# Patient Record
Sex: MALE | Race: WHITE | URBAN - METROPOLITAN AREA
[De-identification: names, ages, dates, MRNs, and addresses within clinical notes are randomized per-mention and may not be internally consistent; named-entity substitution may affect disease eponyms.]

---

## 2023-06-28 ENCOUNTER — TELEPHONE (OUTPATIENT)
Dept: CARDIOLOGY CLINIC | Age: 53
End: 2023-06-28

## 2023-09-13 PROBLEM — I25.10 CORONARY ARTERY DISEASE INVOLVING NATIVE CORONARY ARTERY OF NATIVE HEART WITHOUT ANGINA PECTORIS: Status: ACTIVE | Noted: 2023-09-13

## 2023-09-13 PROBLEM — F19.10 POLYSUBSTANCE ABUSE (HCC): Status: ACTIVE | Noted: 2023-09-13

## 2023-09-13 PROBLEM — I50.42 CHRONIC COMBINED SYSTOLIC AND DIASTOLIC HF (HEART FAILURE) (HCC): Status: ACTIVE | Noted: 2023-09-13

## 2023-09-13 PROBLEM — I48.19 PERSISTENT ATRIAL FIBRILLATION (HCC): Status: ACTIVE | Noted: 2023-09-13

## 2024-01-18 ENCOUNTER — HOSPITAL ENCOUNTER (INPATIENT)
Age: 54
LOS: 5 days | Discharge: NURSING, REHAB OR LONG-TERM CARE | DRG: 201 | End: 2024-01-23
Attending: STUDENT IN AN ORGANIZED HEALTH CARE EDUCATION/TRAINING PROGRAM | Admitting: STUDENT IN AN ORGANIZED HEALTH CARE EDUCATION/TRAINING PROGRAM
Payer: MEDICAID

## 2024-01-18 ENCOUNTER — APPOINTMENT (OUTPATIENT)
Dept: CT IMAGING | Age: 54
DRG: 201 | End: 2024-01-18
Payer: MEDICAID

## 2024-01-18 ENCOUNTER — APPOINTMENT (OUTPATIENT)
Dept: GENERAL RADIOLOGY | Age: 54
DRG: 201 | End: 2024-01-18
Payer: MEDICAID

## 2024-01-18 DIAGNOSIS — R74.01 TRANSAMINITIS: ICD-10-CM

## 2024-01-18 DIAGNOSIS — I50.22 CHRONIC SYSTOLIC HEART FAILURE (HCC): ICD-10-CM

## 2024-01-18 DIAGNOSIS — I48.91 ATRIAL FIBRILLATION WITH RVR (HCC): Primary | ICD-10-CM

## 2024-01-18 DIAGNOSIS — E83.42 HYPOMAGNESEMIA: ICD-10-CM

## 2024-01-18 DIAGNOSIS — Z91.148 NONCOMPLIANCE WITH MEDICATIONS: ICD-10-CM

## 2024-01-18 PROBLEM — R56.9 SEIZURES (HCC): Status: ACTIVE | Noted: 2024-01-18

## 2024-01-18 PROBLEM — I50.32 CHRONIC DIASTOLIC HEART FAILURE (HCC): Status: ACTIVE | Noted: 2023-09-13

## 2024-01-18 PROBLEM — I50.20 HFREF (HEART FAILURE WITH REDUCED EJECTION FRACTION) (HCC): Status: ACTIVE | Noted: 2024-01-18

## 2024-01-18 LAB
ALBUMIN SERPL-MCNC: 3.8 G/DL (ref 3.4–5)
ALBUMIN/GLOB SERPL: 1.2 {RATIO} (ref 1.1–2.2)
ALP SERPL-CCNC: 152 U/L (ref 40–129)
ALT SERPL-CCNC: 125 U/L (ref 10–40)
ANION GAP SERPL CALCULATED.3IONS-SCNC: 15 MMOL/L (ref 3–16)
AST SERPL-CCNC: 119 U/L (ref 15–37)
BASOPHILS # BLD: 0 K/UL (ref 0–0.2)
BASOPHILS NFR BLD: 0.6 %
BILIRUB SERPL-MCNC: 1.8 MG/DL (ref 0–1)
BUN SERPL-MCNC: 11 MG/DL (ref 7–20)
CALCIUM SERPL-MCNC: 8.8 MG/DL (ref 8.3–10.6)
CHLORIDE SERPL-SCNC: 104 MMOL/L (ref 99–110)
CO2 SERPL-SCNC: 22 MMOL/L (ref 21–32)
CREAT SERPL-MCNC: 0.7 MG/DL (ref 0.9–1.3)
DEPRECATED RDW RBC AUTO: 14.1 % (ref 12.4–15.4)
EKG ATRIAL RATE: 136 BPM
EKG DIAGNOSIS: NORMAL
EKG Q-T INTERVAL: 344 MS
EKG QRS DURATION: 90 MS
EKG QTC CALCULATION (BAZETT): 534 MS
EKG R AXIS: 13 DEGREES
EKG T AXIS: 60 DEGREES
EKG VENTRICULAR RATE: 145 BPM
EOSINOPHIL # BLD: 0.1 K/UL (ref 0–0.6)
EOSINOPHIL NFR BLD: 1 %
GFR SERPLBLD CREATININE-BSD FMLA CKD-EPI: >60 ML/MIN/{1.73_M2}
GLUCOSE SERPL-MCNC: 90 MG/DL (ref 70–99)
HCT VFR BLD AUTO: 39.6 % (ref 40.5–52.5)
HGB BLD-MCNC: 13.5 G/DL (ref 13.5–17.5)
INR PPP: 1.3 (ref 0.84–1.16)
LYMPHOCYTES # BLD: 1.2 K/UL (ref 1–5.1)
LYMPHOCYTES NFR BLD: 19.9 %
MAGNESIUM SERPL-MCNC: 1.5 MG/DL (ref 1.8–2.4)
MCH RBC QN AUTO: 30.8 PG (ref 26–34)
MCHC RBC AUTO-ENTMCNC: 34 G/DL (ref 31–36)
MCV RBC AUTO: 90.6 FL (ref 80–100)
MONOCYTES # BLD: 0.5 K/UL (ref 0–1.3)
MONOCYTES NFR BLD: 7.8 %
NEUTROPHILS # BLD: 4.3 K/UL (ref 1.7–7.7)
NEUTROPHILS NFR BLD: 70.7 %
NT-PROBNP SERPL-MCNC: 1734 PG/ML (ref 0–124)
PLATELET # BLD AUTO: 144 K/UL (ref 135–450)
PMV BLD AUTO: 8.6 FL (ref 5–10.5)
POTASSIUM SERPL-SCNC: 3.4 MMOL/L (ref 3.5–5.1)
PROT SERPL-MCNC: 6.9 G/DL (ref 6.4–8.2)
PROTHROMBIN TIME: 16.2 SEC (ref 11.5–14.8)
RBC # BLD AUTO: 4.37 M/UL (ref 4.2–5.9)
SODIUM SERPL-SCNC: 141 MMOL/L (ref 136–145)
TROPONIN, HIGH SENSITIVITY: 13 NG/L (ref 0–22)
WBC # BLD AUTO: 6.2 K/UL (ref 4–11)

## 2024-01-18 PROCEDURE — 96361 HYDRATE IV INFUSION ADD-ON: CPT

## 2024-01-18 PROCEDURE — 96375 TX/PRO/DX INJ NEW DRUG ADDON: CPT

## 2024-01-18 PROCEDURE — 93010 ELECTROCARDIOGRAM REPORT: CPT | Performed by: INTERNAL MEDICINE

## 2024-01-18 PROCEDURE — 2580000003 HC RX 258: Performed by: STUDENT IN AN ORGANIZED HEALTH CARE EDUCATION/TRAINING PROGRAM

## 2024-01-18 PROCEDURE — 85610 PROTHROMBIN TIME: CPT

## 2024-01-18 PROCEDURE — 99285 EMERGENCY DEPT VISIT HI MDM: CPT

## 2024-01-18 PROCEDURE — 6370000000 HC RX 637 (ALT 250 FOR IP): Performed by: STUDENT IN AN ORGANIZED HEALTH CARE EDUCATION/TRAINING PROGRAM

## 2024-01-18 PROCEDURE — 85025 COMPLETE CBC W/AUTO DIFF WBC: CPT

## 2024-01-18 PROCEDURE — 80053 COMPREHEN METABOLIC PANEL: CPT

## 2024-01-18 PROCEDURE — 6360000002 HC RX W HCPCS: Performed by: STUDENT IN AN ORGANIZED HEALTH CARE EDUCATION/TRAINING PROGRAM

## 2024-01-18 PROCEDURE — 6370000000 HC RX 637 (ALT 250 FOR IP): Performed by: INTERNAL MEDICINE

## 2024-01-18 PROCEDURE — 70450 CT HEAD/BRAIN W/O DYE: CPT

## 2024-01-18 PROCEDURE — 2000000000 HC ICU R&B

## 2024-01-18 PROCEDURE — 93005 ELECTROCARDIOGRAM TRACING: CPT | Performed by: STUDENT IN AN ORGANIZED HEALTH CARE EDUCATION/TRAINING PROGRAM

## 2024-01-18 PROCEDURE — 83735 ASSAY OF MAGNESIUM: CPT

## 2024-01-18 PROCEDURE — 96374 THER/PROPH/DIAG INJ IV PUSH: CPT

## 2024-01-18 PROCEDURE — 71045 X-RAY EXAM CHEST 1 VIEW: CPT

## 2024-01-18 PROCEDURE — 84484 ASSAY OF TROPONIN QUANT: CPT

## 2024-01-18 PROCEDURE — C9113 INJ PANTOPRAZOLE SODIUM, VIA: HCPCS | Performed by: STUDENT IN AN ORGANIZED HEALTH CARE EDUCATION/TRAINING PROGRAM

## 2024-01-18 PROCEDURE — 6370000000 HC RX 637 (ALT 250 FOR IP): Performed by: NURSE PRACTITIONER

## 2024-01-18 PROCEDURE — 83880 ASSAY OF NATRIURETIC PEPTIDE: CPT

## 2024-01-18 RX ORDER — ASPIRIN 81 MG/1
81 TABLET ORAL DAILY
Status: DISCONTINUED | OUTPATIENT
Start: 2024-01-18 | End: 2024-01-23 | Stop reason: HOSPADM

## 2024-01-18 RX ORDER — MAGNESIUM SULFATE IN WATER 40 MG/ML
2000 INJECTION, SOLUTION INTRAVENOUS ONCE
Status: COMPLETED | OUTPATIENT
Start: 2024-01-18 | End: 2024-01-18

## 2024-01-18 RX ORDER — POLYETHYLENE GLYCOL 3350 17 G/17G
17 POWDER, FOR SOLUTION ORAL DAILY PRN
Status: DISCONTINUED | OUTPATIENT
Start: 2024-01-18 | End: 2024-01-23 | Stop reason: HOSPADM

## 2024-01-18 RX ORDER — DIGOXIN 125 MCG
125 TABLET ORAL DAILY
Status: DISCONTINUED | OUTPATIENT
Start: 2024-01-18 | End: 2024-01-23 | Stop reason: HOSPADM

## 2024-01-18 RX ORDER — ALPRAZOLAM 0.5 MG/1
0.5 TABLET ORAL 3 TIMES DAILY PRN
Status: DISCONTINUED | OUTPATIENT
Start: 2024-01-18 | End: 2024-01-23 | Stop reason: HOSPADM

## 2024-01-18 RX ORDER — PANTOPRAZOLE SODIUM 40 MG/10ML
40 INJECTION, POWDER, LYOPHILIZED, FOR SOLUTION INTRAVENOUS DAILY
Status: DISCONTINUED | OUTPATIENT
Start: 2024-01-18 | End: 2024-01-23 | Stop reason: HOSPADM

## 2024-01-18 RX ORDER — ALPRAZOLAM 0.5 MG/1
0.5 TABLET ORAL 3 TIMES DAILY PRN
COMMUNITY

## 2024-01-18 RX ORDER — MAGNESIUM SULFATE IN WATER 40 MG/ML
2000 INJECTION, SOLUTION INTRAVENOUS PRN
Status: DISCONTINUED | OUTPATIENT
Start: 2024-01-18 | End: 2024-01-23 | Stop reason: HOSPADM

## 2024-01-18 RX ORDER — SODIUM CHLORIDE 0.9 % (FLUSH) 0.9 %
5-40 SYRINGE (ML) INJECTION PRN
Status: DISCONTINUED | OUTPATIENT
Start: 2024-01-18 | End: 2024-01-23 | Stop reason: HOSPADM

## 2024-01-18 RX ORDER — ALPRAZOLAM 0.5 MG/1
0.5 TABLET ORAL ONCE
Status: COMPLETED | OUTPATIENT
Start: 2024-01-18 | End: 2024-01-18

## 2024-01-18 RX ORDER — ESMOLOL HYDROCHLORIDE 10 MG/ML
25-300 INJECTION, SOLUTION INTRAVENOUS CONTINUOUS
Status: DISCONTINUED | OUTPATIENT
Start: 2024-01-18 | End: 2024-01-19

## 2024-01-18 RX ORDER — POTASSIUM CHLORIDE 7.45 MG/ML
10 INJECTION INTRAVENOUS PRN
Status: DISCONTINUED | OUTPATIENT
Start: 2024-01-18 | End: 2024-01-23 | Stop reason: HOSPADM

## 2024-01-18 RX ORDER — ONDANSETRON 2 MG/ML
4 INJECTION INTRAMUSCULAR; INTRAVENOUS EVERY 6 HOURS PRN
Status: DISCONTINUED | OUTPATIENT
Start: 2024-01-18 | End: 2024-01-23 | Stop reason: HOSPADM

## 2024-01-18 RX ORDER — SODIUM CHLORIDE 0.9 % (FLUSH) 0.9 %
5-40 SYRINGE (ML) INJECTION EVERY 12 HOURS SCHEDULED
Status: DISCONTINUED | OUTPATIENT
Start: 2024-01-18 | End: 2024-01-23 | Stop reason: HOSPADM

## 2024-01-18 RX ORDER — 0.9 % SODIUM CHLORIDE 0.9 %
250 INTRAVENOUS SOLUTION INTRAVENOUS ONCE
Status: COMPLETED | OUTPATIENT
Start: 2024-01-18 | End: 2024-01-18

## 2024-01-18 RX ORDER — POTASSIUM CHLORIDE 29.8 MG/ML
20 INJECTION INTRAVENOUS PRN
Status: DISCONTINUED | OUTPATIENT
Start: 2024-01-18 | End: 2024-01-23 | Stop reason: HOSPADM

## 2024-01-18 RX ORDER — ACETAMINOPHEN 650 MG/1
650 SUPPOSITORY RECTAL EVERY 6 HOURS PRN
Status: DISCONTINUED | OUTPATIENT
Start: 2024-01-18 | End: 2024-01-23 | Stop reason: HOSPADM

## 2024-01-18 RX ORDER — LEVETIRACETAM 500 MG/1
500 TABLET ORAL 2 TIMES DAILY
Status: DISCONTINUED | OUTPATIENT
Start: 2024-01-18 | End: 2024-01-23 | Stop reason: HOSPADM

## 2024-01-18 RX ORDER — TRAMADOL HYDROCHLORIDE 50 MG/1
50 TABLET ORAL ONCE
Status: COMPLETED | OUTPATIENT
Start: 2024-01-18 | End: 2024-01-18

## 2024-01-18 RX ORDER — ACETAMINOPHEN 325 MG/1
650 TABLET ORAL EVERY 6 HOURS PRN
Status: DISCONTINUED | OUTPATIENT
Start: 2024-01-18 | End: 2024-01-23 | Stop reason: HOSPADM

## 2024-01-18 RX ORDER — LEVETIRACETAM 500 MG/5ML
1000 INJECTION, SOLUTION, CONCENTRATE INTRAVENOUS ONCE
Status: COMPLETED | OUTPATIENT
Start: 2024-01-18 | End: 2024-01-18

## 2024-01-18 RX ORDER — SODIUM CHLORIDE 9 MG/ML
INJECTION, SOLUTION INTRAVENOUS PRN
Status: DISCONTINUED | OUTPATIENT
Start: 2024-01-18 | End: 2024-01-23 | Stop reason: HOSPADM

## 2024-01-18 RX ORDER — ONDANSETRON 4 MG/1
4 TABLET, ORALLY DISINTEGRATING ORAL EVERY 8 HOURS PRN
Status: DISCONTINUED | OUTPATIENT
Start: 2024-01-18 | End: 2024-01-23 | Stop reason: HOSPADM

## 2024-01-18 RX ADMIN — MAGNESIUM SULFATE HEPTAHYDRATE 2000 MG: 40 INJECTION, SOLUTION INTRAVENOUS at 14:06

## 2024-01-18 RX ADMIN — TRAMADOL HYDROCHLORIDE 50 MG: 50 TABLET ORAL at 17:57

## 2024-01-18 RX ADMIN — LEVETIRACETAM 500 MG: 500 TABLET, FILM COATED ORAL at 17:57

## 2024-01-18 RX ADMIN — ASPIRIN 81 MG: 81 TABLET, COATED ORAL at 17:57

## 2024-01-18 RX ADMIN — ALPRAZOLAM 0.5 MG: 0.5 TABLET ORAL at 14:09

## 2024-01-18 RX ADMIN — PANTOPRAZOLE SODIUM 40 MG: 40 INJECTION, POWDER, FOR SOLUTION INTRAVENOUS at 17:58

## 2024-01-18 RX ADMIN — ESMOLOL HYDROCHLORIDE 50 MCG/KG/MIN: 10 INJECTION INTRAVENOUS at 13:28

## 2024-01-18 RX ADMIN — LEVETIRACETAM 1000 MG: 100 INJECTION, SOLUTION INTRAVENOUS at 13:46

## 2024-01-18 RX ADMIN — ALPRAZOLAM 0.5 MG: 0.5 TABLET ORAL at 20:34

## 2024-01-18 RX ADMIN — DIGOXIN 125 MCG: 125 TABLET ORAL at 17:57

## 2024-01-18 RX ADMIN — SODIUM CHLORIDE 250 ML: 9 INJECTION, SOLUTION INTRAVENOUS at 13:28

## 2024-01-18 RX ADMIN — APIXABAN 5 MG: 5 TABLET, FILM COATED ORAL at 20:35

## 2024-01-18 RX ADMIN — POTASSIUM BICARBONATE 20 MEQ: 782 TABLET, EFFERVESCENT ORAL at 14:09

## 2024-01-18 RX ADMIN — LEVETIRACETAM 500 MG: 500 TABLET, FILM COATED ORAL at 20:35

## 2024-01-18 RX ADMIN — ESMOLOL HYDROCHLORIDE 150 MCG/KG/MIN: 10 INJECTION INTRAVENOUS at 23:46

## 2024-01-18 RX ADMIN — Medication 10 ML: at 20:35

## 2024-01-18 RX ADMIN — ESMOLOL HYDROCHLORIDE 100.08 MCG/KG/MIN: 10 INJECTION INTRAVENOUS at 19:36

## 2024-01-18 RX ADMIN — SACUBITRIL AND VALSARTAN 1 TABLET: 24; 26 TABLET, FILM COATED ORAL at 20:34

## 2024-01-18 ASSESSMENT — PAIN SCALES - GENERAL
PAINLEVEL_OUTOF10: 8
PAINLEVEL_OUTOF10: 8

## 2024-01-18 ASSESSMENT — PAIN - FUNCTIONAL ASSESSMENT: PAIN_FUNCTIONAL_ASSESSMENT: 0-10

## 2024-01-18 NOTE — ACP (ADVANCE CARE PLANNING)
Advanced Care Planning Note.    Purpose of Encounter: Advanced care planning in light of atrial fibrillation with RVR  Parties In Attendance: Patient,   Decisional Capacity: Yes  Subjective: Chest pain  Objective: Cr 0.7  Goals of Care Determination: Patient/POA wishes to seek full treatment  Plan:  Echo. Cardiology consult. Esmolol drip.  Code Status: Full code    Time spent on Advanced care Plannin minutes  Advanced Care Planning Documents: Completed advanced directives on chart, sonViktor, is the POA.    [place],   2024 2:15 PM

## 2024-01-18 NOTE — H&P
Hospital Medicine History & Physical        Name:  Lazaro Resendiz  /Age/Sex: 1970  (53 y.o. male)  MRN & CSN:  [id number] & [id number]     PCP: Daniel Amador DO    Date of Admission: 2024    Date of Service: Patient seen/examined on 2024    Patient Status:  Inpatient - Patient will most likely require more than 48 hours of treatment and requires intensive medical treatment/monitoring     Chief Complaint:    Chief Complaint   Patient presents with    Chest Pain     FF EMS from home d/t chest pain x 3 days. Per ems, pt was in a fib with HR of 180s. Ems gave 6mg adenosine with no return to sinus rhythm. HX of HF, a-fib, seizures          History Of Present Illness:     53 y.o. male with PMHx of Afib, seizures, CHF who presented to [facility] with complaints of chest pain.    Patient thinks he had a seizure at home, though he is not sure and it was unwitnessed as patient lives at home by himself.  Patient states he has seizures every day, but unable to give more detail about this.  Patient states he is compliant with all of his medications, though still has seizures.  Additionally, patient states he is always in atrial fibrillation and misses doses of his A-fib medications frequently.    Additionally, patient states he has been feeling sharp chest pain for the past 3 days.  Located on the left side of his chest and does not radiate.  He states it is relieved by pressing on it.    Patient endorses subjective fever.  However, denies shortness of breath, nausea, vomiting, GI/ symptoms, and edema.    Endorses everyday tobacco use.  Smokes 1-2 cigarettes/day.  Endorses occasional alcohol use.  Denies illicit drug use.  Used to use heroin, methamphetamine, and/or cocaine in the past, but stopped many years ago.    Past Medical History:          Diagnosis Date    Atrial fibrillation (HCC)     Back pain     CHF (congestive heart failure) (HCC)     OCD (obsessive compulsive disorder)     severe

## 2024-01-18 NOTE — ED NOTES
Pt presents to the ER from home due to constant chest pain for 3 days now. Pt has history of seizures, a fib, and CHF. Upon arrival, pt very shaky and states he feels anxious. Pt also told this RN that he fell this morning. Seizure pads placed on bed railings for precaution.  A-fib RVR noted on monitor. Will continue to monitor at this time.   
Pt unable to tolerate potassium PO. MD aware.   
Report given to CVU RN. No further questions at this time. Pt transported with this RN, on tele and drip infusing   
Timeline found under the Summary Nursing Index tab.    Recommendation    Pending orders   Plan for Discharge (if known):   Additional Comments:    If any further questions, please call Sending RN at     Electronically signed by: Electronically signed by Le Santamaria RN on 1/18/2024 at 4:03 PM

## 2024-01-18 NOTE — ED PROVIDER NOTES
Prescriptions    No medications on file       Controlled Substances Monitoring:    If the patient was prescribed a controlled substance today, the PDMP was reviewed as documented below.         No data to display                (Please note that portions of this note were completed with a voice recognition program.  Efforts were made to edit the dictations but occasionally words are mis-transcribed.)    Al Contreras MD (electronically signed)  Attending Emergency Physician           Al Contreras MD  01/18/24 2239

## 2024-01-19 PROBLEM — I10 HTN (HYPERTENSION), BENIGN: Status: ACTIVE | Noted: 2024-01-19

## 2024-01-19 PROBLEM — G40.909 SEIZURE DISORDER (HCC): Status: ACTIVE | Noted: 2024-01-18

## 2024-01-19 PROBLEM — Z91.148 NONCOMPLIANCE WITH MEDICATIONS: Status: ACTIVE | Noted: 2024-01-19

## 2024-01-19 LAB
ALBUMIN SERPL-MCNC: 3.3 G/DL (ref 3.4–5)
AMPHETAMINES UR QL SCN>1000 NG/ML: POSITIVE
AMPHETAMINES UR QL SCN>1000 NG/ML: POSITIVE
ANION GAP SERPL CALCULATED.3IONS-SCNC: 9 MMOL/L (ref 3–16)
BARBITURATES UR QL SCN>200 NG/ML: ABNORMAL
BARBITURATES UR QL SCN>200 NG/ML: ABNORMAL
BASOPHILS # BLD: 0 K/UL (ref 0–0.2)
BASOPHILS NFR BLD: 0.9 %
BENZODIAZ UR QL SCN>200 NG/ML: POSITIVE
BENZODIAZ UR QL SCN>200 NG/ML: POSITIVE
BUN SERPL-MCNC: 11 MG/DL (ref 7–20)
CALCIUM SERPL-MCNC: 8.2 MG/DL (ref 8.3–10.6)
CANNABINOIDS UR QL SCN>50 NG/ML: POSITIVE
CANNABINOIDS UR QL SCN>50 NG/ML: POSITIVE
CHLORIDE SERPL-SCNC: 108 MMOL/L (ref 99–110)
CO2 SERPL-SCNC: 26 MMOL/L (ref 21–32)
COCAINE UR QL SCN: ABNORMAL
COCAINE UR QL SCN: ABNORMAL
CREAT SERPL-MCNC: 0.7 MG/DL (ref 0.9–1.3)
DEPRECATED RDW RBC AUTO: 14 % (ref 12.4–15.4)
DIGOXIN SERPL-MCNC: 0.5 NG/ML (ref 0.8–2)
DRUG SCREEN COMMENT UR-IMP: ABNORMAL
DRUG SCREEN COMMENT UR-IMP: ABNORMAL
EOSINOPHIL # BLD: 0.1 K/UL (ref 0–0.6)
EOSINOPHIL NFR BLD: 1.8 %
FENTANYL SCREEN, URINE: ABNORMAL
FENTANYL SCREEN, URINE: ABNORMAL
GFR SERPLBLD CREATININE-BSD FMLA CKD-EPI: >60 ML/MIN/{1.73_M2}
GLUCOSE SERPL-MCNC: 109 MG/DL (ref 70–99)
HCT VFR BLD AUTO: 35.4 % (ref 40.5–52.5)
HGB BLD-MCNC: 11.8 G/DL (ref 13.5–17.5)
LEVETIRACETAM SERPL-MCNC: 32.9 UG/ML (ref 6–46)
LYMPHOCYTES # BLD: 1.3 K/UL (ref 1–5.1)
LYMPHOCYTES NFR BLD: 33.3 %
MAGNESIUM SERPL-MCNC: 1.7 MG/DL (ref 1.8–2.4)
MCH RBC QN AUTO: 30.3 PG (ref 26–34)
MCHC RBC AUTO-ENTMCNC: 33.4 G/DL (ref 31–36)
MCV RBC AUTO: 90.9 FL (ref 80–100)
MEDICATION DOSE-MCNC: NORMAL
METHADONE UR QL SCN>300 NG/ML: ABNORMAL
METHADONE UR QL SCN>300 NG/ML: ABNORMAL
MONOCYTES # BLD: 0.4 K/UL (ref 0–1.3)
MONOCYTES NFR BLD: 9.8 %
NEUTROPHILS # BLD: 2 K/UL (ref 1.7–7.7)
NEUTROPHILS NFR BLD: 54.2 %
OPIATES UR QL SCN>300 NG/ML: ABNORMAL
OPIATES UR QL SCN>300 NG/ML: ABNORMAL
OXYCODONE UR QL SCN: POSITIVE
OXYCODONE UR QL SCN: POSITIVE
PCP UR QL SCN>25 NG/ML: ABNORMAL
PCP UR QL SCN>25 NG/ML: ABNORMAL
PH UR STRIP: 5 [PH]
PH UR STRIP: 6 [PH]
PHOSPHATE SERPL-MCNC: 3.2 MG/DL (ref 2.5–4.9)
PLATELET # BLD AUTO: 118 K/UL (ref 135–450)
PMV BLD AUTO: 9.2 FL (ref 5–10.5)
POTASSIUM SERPL-SCNC: 3.9 MMOL/L (ref 3.5–5.1)
RBC # BLD AUTO: 3.89 M/UL (ref 4.2–5.9)
REASON FOR REJECTION: NORMAL
REJECTED TEST: NORMAL
SODIUM SERPL-SCNC: 143 MMOL/L (ref 136–145)
TROPONIN, HIGH SENSITIVITY: 13 NG/L (ref 0–22)
WBC # BLD AUTO: 3.8 K/UL (ref 4–11)

## 2024-01-19 PROCEDURE — APPSS60 APP SPLIT SHARED TIME 46-60 MINUTES

## 2024-01-19 PROCEDURE — 93306 TTE W/DOPPLER COMPLETE: CPT

## 2024-01-19 PROCEDURE — 97116 GAIT TRAINING THERAPY: CPT

## 2024-01-19 PROCEDURE — 99233 SBSQ HOSP IP/OBS HIGH 50: CPT | Performed by: INTERNAL MEDICINE

## 2024-01-19 PROCEDURE — 97110 THERAPEUTIC EXERCISES: CPT

## 2024-01-19 PROCEDURE — 80061 LIPID PANEL: CPT

## 2024-01-19 PROCEDURE — 2580000003 HC RX 258: Performed by: STUDENT IN AN ORGANIZED HEALTH CARE EDUCATION/TRAINING PROGRAM

## 2024-01-19 PROCEDURE — 6370000000 HC RX 637 (ALT 250 FOR IP): Performed by: NURSE PRACTITIONER

## 2024-01-19 PROCEDURE — 95816 EEG AWAKE AND DROWSY: CPT | Performed by: PSYCHIATRY & NEUROLOGY

## 2024-01-19 PROCEDURE — 95819 EEG AWAKE AND ASLEEP: CPT

## 2024-01-19 PROCEDURE — 97530 THERAPEUTIC ACTIVITIES: CPT

## 2024-01-19 PROCEDURE — 6360000002 HC RX W HCPCS: Performed by: STUDENT IN AN ORGANIZED HEALTH CARE EDUCATION/TRAINING PROGRAM

## 2024-01-19 PROCEDURE — 99223 1ST HOSP IP/OBS HIGH 75: CPT | Performed by: INTERNAL MEDICINE

## 2024-01-19 PROCEDURE — 2000000000 HC ICU R&B

## 2024-01-19 PROCEDURE — 80069 RENAL FUNCTION PANEL: CPT

## 2024-01-19 PROCEDURE — 85025 COMPLETE CBC W/AUTO DIFF WBC: CPT

## 2024-01-19 PROCEDURE — 83036 HEMOGLOBIN GLYCOSYLATED A1C: CPT

## 2024-01-19 PROCEDURE — 80177 DRUG SCRN QUAN LEVETIRACETAM: CPT

## 2024-01-19 PROCEDURE — 97162 PT EVAL MOD COMPLEX 30 MIN: CPT

## 2024-01-19 PROCEDURE — 84484 ASSAY OF TROPONIN QUANT: CPT

## 2024-01-19 PROCEDURE — 80307 DRUG TEST PRSMV CHEM ANLYZR: CPT

## 2024-01-19 PROCEDURE — 83735 ASSAY OF MAGNESIUM: CPT

## 2024-01-19 PROCEDURE — APPNB30 APP NON BILLABLE TIME 0-30 MINS

## 2024-01-19 PROCEDURE — 97535 SELF CARE MNGMENT TRAINING: CPT

## 2024-01-19 PROCEDURE — 84443 ASSAY THYROID STIM HORMONE: CPT

## 2024-01-19 PROCEDURE — 6370000000 HC RX 637 (ALT 250 FOR IP): Performed by: STUDENT IN AN ORGANIZED HEALTH CARE EDUCATION/TRAINING PROGRAM

## 2024-01-19 PROCEDURE — C9113 INJ PANTOPRAZOLE SODIUM, VIA: HCPCS | Performed by: STUDENT IN AN ORGANIZED HEALTH CARE EDUCATION/TRAINING PROGRAM

## 2024-01-19 PROCEDURE — 6370000000 HC RX 637 (ALT 250 FOR IP): Performed by: INTERNAL MEDICINE

## 2024-01-19 PROCEDURE — 80162 ASSAY OF DIGOXIN TOTAL: CPT

## 2024-01-19 PROCEDURE — 99222 1ST HOSP IP/OBS MODERATE 55: CPT | Performed by: PSYCHIATRY & NEUROLOGY

## 2024-01-19 PROCEDURE — 97165 OT EVAL LOW COMPLEX 30 MIN: CPT

## 2024-01-19 RX ORDER — HYDROXYZINE PAMOATE 25 MG/1
25 CAPSULE ORAL 3 TIMES DAILY PRN
Status: DISCONTINUED | OUTPATIENT
Start: 2024-01-19 | End: 2024-01-23 | Stop reason: HOSPADM

## 2024-01-19 RX ORDER — MAGNESIUM SULFATE IN WATER 40 MG/ML
2000 INJECTION, SOLUTION INTRAVENOUS ONCE
Status: COMPLETED | OUTPATIENT
Start: 2024-01-19 | End: 2024-01-19

## 2024-01-19 RX ORDER — DIGOXIN 125 MCG
250 TABLET ORAL ONCE
Status: COMPLETED | OUTPATIENT
Start: 2024-01-19 | End: 2024-01-19

## 2024-01-19 RX ORDER — METOPROLOL SUCCINATE 50 MG/1
50 TABLET, EXTENDED RELEASE ORAL DAILY
Status: DISCONTINUED | OUTPATIENT
Start: 2024-01-19 | End: 2024-01-23 | Stop reason: HOSPADM

## 2024-01-19 RX ORDER — DIGOXIN 125 MCG
500 TABLET ORAL ONCE
Status: COMPLETED | OUTPATIENT
Start: 2024-01-19 | End: 2024-01-19

## 2024-01-19 RX ORDER — OXYCODONE HYDROCHLORIDE AND ACETAMINOPHEN 5; 325 MG/1; MG/1
1 TABLET ORAL EVERY 8 HOURS PRN
Status: DISCONTINUED | OUTPATIENT
Start: 2024-01-19 | End: 2024-01-23 | Stop reason: HOSPADM

## 2024-01-19 RX ORDER — SPIRONOLACTONE 25 MG/1
25 TABLET ORAL DAILY
Status: DISCONTINUED | OUTPATIENT
Start: 2024-01-19 | End: 2024-01-23 | Stop reason: HOSPADM

## 2024-01-19 RX ADMIN — APIXABAN 5 MG: 5 TABLET, FILM COATED ORAL at 09:50

## 2024-01-19 RX ADMIN — Medication 10 ML: at 20:25

## 2024-01-19 RX ADMIN — LEVETIRACETAM 500 MG: 500 TABLET, FILM COATED ORAL at 20:23

## 2024-01-19 RX ADMIN — SACUBITRIL AND VALSARTAN 1 TABLET: 24; 26 TABLET, FILM COATED ORAL at 09:50

## 2024-01-19 RX ADMIN — OXYCODONE AND ACETAMINOPHEN 1 TABLET: 5; 325 TABLET ORAL at 12:14

## 2024-01-19 RX ADMIN — ALPRAZOLAM 0.5 MG: 0.5 TABLET ORAL at 11:49

## 2024-01-19 RX ADMIN — ESMOLOL HYDROCHLORIDE 125 MCG/KG/MIN: 10 INJECTION INTRAVENOUS at 07:22

## 2024-01-19 RX ADMIN — ESMOLOL HYDROCHLORIDE 150 MCG/KG/MIN: 10 INJECTION INTRAVENOUS at 03:36

## 2024-01-19 RX ADMIN — SACUBITRIL AND VALSARTAN 1 TABLET: 24; 26 TABLET, FILM COATED ORAL at 20:23

## 2024-01-19 RX ADMIN — EMPAGLIFLOZIN 10 MG: 10 TABLET, FILM COATED ORAL at 14:09

## 2024-01-19 RX ADMIN — ESMOLOL HYDROCHLORIDE 125 MCG/KG/MIN: 10 INJECTION INTRAVENOUS at 11:45

## 2024-01-19 RX ADMIN — PANTOPRAZOLE SODIUM 40 MG: 40 INJECTION, POWDER, FOR SOLUTION INTRAVENOUS at 09:50

## 2024-01-19 RX ADMIN — SODIUM CHLORIDE: 9 INJECTION, SOLUTION INTRAVENOUS at 11:56

## 2024-01-19 RX ADMIN — ASPIRIN 81 MG: 81 TABLET, COATED ORAL at 09:50

## 2024-01-19 RX ADMIN — MAGNESIUM SULFATE HEPTAHYDRATE 2000 MG: 40 INJECTION, SOLUTION INTRAVENOUS at 11:57

## 2024-01-19 RX ADMIN — ACETAMINOPHEN 650 MG: 325 TABLET ORAL at 03:39

## 2024-01-19 RX ADMIN — SPIRONOLACTONE 25 MG: 25 TABLET ORAL at 14:09

## 2024-01-19 RX ADMIN — DIGOXIN 250 MCG: 125 TABLET ORAL at 20:24

## 2024-01-19 RX ADMIN — ALPRAZOLAM 0.5 MG: 0.5 TABLET ORAL at 18:46

## 2024-01-19 RX ADMIN — METOPROLOL SUCCINATE 50 MG: 50 TABLET, FILM COATED, EXTENDED RELEASE ORAL at 14:09

## 2024-01-19 RX ADMIN — DIGOXIN 125 MCG: 125 TABLET ORAL at 09:50

## 2024-01-19 RX ADMIN — DIGOXIN 500 MCG: 125 TABLET ORAL at 14:08

## 2024-01-19 RX ADMIN — Medication 10 ML: at 09:53

## 2024-01-19 RX ADMIN — HYDROXYZINE PAMOATE 25 MG: 25 CAPSULE ORAL at 11:49

## 2024-01-19 RX ADMIN — OXYCODONE AND ACETAMINOPHEN 1 TABLET: 5; 325 TABLET ORAL at 20:30

## 2024-01-19 RX ADMIN — LEVETIRACETAM 500 MG: 500 TABLET, FILM COATED ORAL at 09:50

## 2024-01-19 ASSESSMENT — PAIN SCALES - GENERAL
PAINLEVEL_OUTOF10: 8
PAINLEVEL_OUTOF10: 7
PAINLEVEL_OUTOF10: 8
PAINLEVEL_OUTOF10: 0

## 2024-01-19 ASSESSMENT — PAIN DESCRIPTION - DESCRIPTORS
DESCRIPTORS: ACHING
DESCRIPTORS: THROBBING

## 2024-01-19 ASSESSMENT — PAIN DESCRIPTION - LOCATION
LOCATION: FACE
LOCATION: BACK

## 2024-01-19 ASSESSMENT — PAIN DESCRIPTION - ORIENTATION
ORIENTATION: LOWER

## 2024-01-19 ASSESSMENT — PAIN DESCRIPTION - PAIN TYPE
TYPE: CHRONIC PAIN

## 2024-01-19 NOTE — CARE COORDINATION
Case Management Assessment  Initial Evaluation    Date/Time of Evaluation: 1/19/2024 11:16 AM   Assessment Completed by: NEFTALI GRANADO RN    If patient is discharged prior to next notation, then this note serves as note for discharge by case management.    Patient Name: Lazaro Resendiz                   YOB: 1970  Diagnosis: Chronic systolic heart failure (HCC) [I50.22]  Hypomagnesemia [E83.42]  Transaminitis [R74.01]  Noncompliance with medications [Z91.148]  Atrial fibrillation with RVR (HCC) [I48.91]                   Date / Time: 1/18/2024 12:57 PM    Patient Admission Status: Inpatient   Readmission Risk (Low < 19, Mod (19-27), High > 27): Readmission Risk Score: 12    Current PCP: Daniel Amador, DO  PCP verified by CM? Yes    Chart Reviewed: Yes      History Provided by: Patient  Patient Orientation: Alert and Oriented, Person, Place, Situation    Patient Cognition: Alert    Hospitalization in the last 30 days (Readmission):  No    If yes, Readmission Assessment in CM Navigator will be completed.    Advance Directives:      Code Status: Full Code   Patient's Primary Decision Maker is: Legal Next of Kin      Discharge Planning:    Patient lives with: Alone Type of Home: Other (Comment) (was staying a [facility] in [place])  Primary Care Giver: Self  Patient Support Systems include: None, Other (Comment) (see CM note section)   Current Financial resources: Medicaid  Current community resources: Other (Comment), Housing, Transportation (shelter information provided, was staying at [facility], CM provided [place] medicaid number to call for benefits including transport)  Current services prior to admission: Durable Medical Equipment            Current DME: [employer] (only used prn, stated recently using more)            Type of Home Care services:  None    ADLS  Prior functional level: Independent in ADLs/IADLs  Current functional level: Assistance with the following:, Mobility (therapy

## 2024-01-19 NOTE — CARE COORDINATION
CM earlier left vm for Cecilia with [facility] facilities [phone] and CM returned her call again and left vm for her to call me.     Some referrals did not go through on [facility].    CM called Gladis with [facility] [phone] and vm left to consider pt for all her facilities.     CM spoke to Ana María [phone] with home at [facility], home at [facility] and [facility]. She will review for all facilities.     CM returned call to Winston with [facility] [phone] and left vm asking for call back.     CM called [facility] [phone] in admissions and left vm referring pt.       CM called Anushka with [facility] admissions [phone] and left vm referring pt.       CM called [facility] admissions Tiesha [phone] and left vm referring patient.     CM called [facility] [phone] and left vm for Yulia referring pt.     Anna Masters, RN, BSN  [phone]

## 2024-01-19 NOTE — CARE COORDINATION
JOSE LUIS returned call to Pavithra with [facility] [phone] who states her admissions person is coming in this afternoon to talk to pt. She also asked about pt's drug use.     CM reviewed neurology note recently completed and then spoke to pt while on phone with Pavithra and pt states when he overdosed last June he didn't know what he was taking and it was fentanyl. Pt aware that there is a urine tox screen ordered and will need to provide sample as this can also be a barrier to placement.     CM updated pt rep from facility will be in to talk to him.    Pt had previously stayed at the [facility] but had disagreement with a nurse there and he states the DON took her side.     Anna Masters RN, BSN  [phone]     Updated at 3:27 PM    JOSE LUIS received VM from Gladis with [facility] and other facilities [phone] stating she is reviewing pt and sending referrals to her facilities.      Anna Masters RN, BSN  [phone]

## 2024-01-19 NOTE — CONSULTS
[facility]  Cardiology Note  [phone]      Chief Complaint   Patient presents with    Chest Pain     FF EMS from home d/t chest pain x 3 days. Per ems, pt was in a fib with HR of 180s. Ems gave 6mg adenosine with no return to sinus rhythm. HX of HF, a-fib, seizures       History of Present Illness:  Lazaro Resendiz is a 53 y.o. patient PMHx AF, NICM, seizure D/O, EtOH cirrhosis who presented to the hospital with complaints of chest pain, AF with RVR and heart failure. I have been asked to provide consultation regarding further management and testing.    Patient was placed on an esmolol gtt for rate control and his entresto was restarted. Rates are in the 100-110's. Additionally patient is on digoxin.    No complaints today    Past Medical History:   has a past medical history of Atrial fibrillation (HCC), Back pain, CHF (congestive heart failure) (HCC), OCD (obsessive compulsive disorder), and Seizures (HCC).    Surgical History:   has a past surgical history that includes Knee arthroscopy (Bilateral) and Total shoulder arthroplasty (Left).     Social History:   reports that he has quit smoking. His smoking use included cigarettes. He uses smokeless tobacco. He reports current alcohol use of about 5.0 standard drinks of alcohol per week. He reports that he does not currently use drugs after having used the following drugs: IV and Cocaine. Frequency: 7.00 times per week.     Family History:  Family History   Problem Relation Age of Onset    Cancer Mother     Heart Disease Father     Cancer Sister     Mental Illness Sister        Home Medications:  Were reviewed and are listed in nursing record. and/or listed below  Prior to Admission medications    Medication Sig Start Date End Date Taking? Authorizing Provider   apixaban (ELIQUIS) 5 MG TABS tablet Take by mouth 2 times daily   Yes Provider, MD Thor   ALPRAZolam (XANAX) 0.5 MG tablet Take 1 tablet by mouth 3 times daily as needed for Anxiety. Max 
I.V.:868.7]  Out: 250    Wt Readings from Last 3 Encounters:   24 82.8 kg (182 lb 9.6 oz)     Temp  Av.8 °F (36.6 °C)  Min: 97.6 °F (36.4 °C)  Max: 98.1 °F (36.7 °C)  Pulse  Av.5  Min: 97  Max: 165  BP  Min: 84/67  Max: 150/108  SpO2  Av.5 %  Min: 97 %  Max: 100 %    Intake/Output Summary (Last 24 hours) at 2024 0755  Last data filed at 2024 0519  Gross per 24 hour   Intake 1154.62 ml   Output 250 ml   Net 904.62 ml       Constitutional: Oriented. No distress.   Cardiovascular: Normal rate, regular rhythm, S1&S2. + systolic murmur     Pulmonary/Chest: Bilateral respiratory sounds. No rhonchi.    Abdominal: Soft. No tenderness   Musculoskeletal: No edema    Neurological: Alert and oriented. Follows command    Active Hospital Problems    Diagnosis Date Noted    HFrEF (heart failure with reduced ejection fraction) ([facility]) [I50.20] 2024    Atrial fibrillation with RVR ([facility]) [I48.91] 2024    Seizures ([facility]) [R56.9] 2024    Persistent atrial fibrillation ([facility]) [I48.19] 2023    Polysubstance abuse ([facility]) [F19.10] 2023    Coronary artery disease involving native coronary artery of native heart without angina pectoris [I25.10] 2023    Chronic diastolic heart failure ([facility]) [I50.32] 2023       Past Medical History:   Diagnosis Date    Atrial fibrillation (HCC)     Back pain     CHF (congestive heart failure) ([facility])     OCD (obsessive compulsive disorder)     severe    Seizures ([facility])         Past Surgical History:   Procedure Laterality Date    KNEE ARTHROSCOPY Bilateral     SHOULDER ARTHROPLASTY Left        No Known Allergies     reports that he has quit smoking. His smoking use included cigarettes. He uses smokeless tobacco. He reports current alcohol use of about 5.0 standard drinks of alcohol per week. He reports that he does not currently use drugs after having used the following drugs: IV and Cocaine. Frequency: 7.00 times per week.     Discussed with 
limited from the patient.  He started having seizures in June of last year.  He was on Keppra but not consistent.  Denies any recent falling or injury or fever or chills.  Recent MRI September showed no acute changes but no recent EEG.  Describes daily spells of generalized body shaking at home but no LOC overlay or tongue biting or bladder incontinence.  No recent fall or injury.    On examination:  No acute distress  Awake and alert x3.  Fluent speech.  Appears appropriate with intact recent and remote memory.  Pupil reactive and symmetric, extraocular motor intact, no ophthalmoplegia, face is symmetric and tongue is midline  No focal weakness with symmetric DTR  Normal tone  No sensory disturbance or abnormal movement    Impression:  Possible recurrent seizure versus nonepileptic seizure.  So far semiology is not consistent with epileptic seizures since the patient is completely coherent during such incidents.  Giving unwitnessed episode, will still continue with Keppra 500 mg twice daily.  Discussed risk of psychosis with the patient, suicidal ideation or thoughts as well as seizure precautions including no driving until he is cleared from his physician.  He voiced understanding.  Will get EEG and no need to repeat MRI at this point since he exam is normal and MRI from last year was unremarkable.  DT precautions  Continue blood pressure control and continue current medications  Thiamine replacement  Aspirin and statin  Can be discharged from neurology once medically stable  Will follow PRN   Please call for ?S          Electronically signed by Siri Powers MD on 1/19/24 at 3:35 PM EST         This dictation was generated by voice recognition computer software. Although all attempts are made to edit the dictation for accuracy, there may be errors in the  transcription that are not intended

## 2024-01-19 NOTE — CARE COORDINATION
CM attempted to meet with pt and therapy in room.    Neuro consult pending.     CM will follow up at later as time allows.    Anna Masters RN, BSN  [phone]

## 2024-01-20 LAB
ALBUMIN SERPL-MCNC: 3 G/DL (ref 3.4–5)
ANION GAP SERPL CALCULATED.3IONS-SCNC: 7 MMOL/L (ref 3–16)
BASOPHILS # BLD: 0 K/UL (ref 0–0.2)
BASOPHILS NFR BLD: 0 %
BUN SERPL-MCNC: 7 MG/DL (ref 7–20)
CALCIUM SERPL-MCNC: 7.9 MG/DL (ref 8.3–10.6)
CHLORIDE SERPL-SCNC: 110 MMOL/L (ref 99–110)
CHOLEST SERPL-MCNC: 109 MG/DL (ref 0–199)
CO2 SERPL-SCNC: 25 MMOL/L (ref 21–32)
CREAT SERPL-MCNC: 0.6 MG/DL (ref 0.9–1.3)
DEPRECATED RDW RBC AUTO: 13.9 % (ref 12.4–15.4)
EOSINOPHIL # BLD: 0.1 K/UL (ref 0–0.6)
EOSINOPHIL NFR BLD: 3 %
EST. AVERAGE GLUCOSE BLD GHB EST-MCNC: 76.7 MG/DL
GFR SERPLBLD CREATININE-BSD FMLA CKD-EPI: >60 ML/MIN/{1.73_M2}
GLUCOSE SERPL-MCNC: 95 MG/DL (ref 70–99)
HBA1C MFR BLD: 4.3 %
HCT VFR BLD AUTO: 36.5 % (ref 40.5–52.5)
HDLC SERPL-MCNC: 17 MG/DL (ref 40–60)
HGB BLD-MCNC: 12.4 G/DL (ref 13.5–17.5)
HYPOCHROMIA BLD QL SMEAR: ABNORMAL
LDLC SERPL CALC-MCNC: 75 MG/DL
LYMPHOCYTES # BLD: 1.4 K/UL (ref 1–5.1)
LYMPHOCYTES NFR BLD: 29 %
MAGNESIUM SERPL-MCNC: 1.6 MG/DL (ref 1.8–2.4)
MCH RBC QN AUTO: 30.9 PG (ref 26–34)
MCHC RBC AUTO-ENTMCNC: 34 G/DL (ref 31–36)
MCV RBC AUTO: 90.9 FL (ref 80–100)
MONOCYTES # BLD: 0.2 K/UL (ref 0–1.3)
MONOCYTES NFR BLD: 6 %
NEUTROPHILS # BLD: 2 K/UL (ref 1.7–7.7)
NEUTROPHILS NFR BLD: 53 %
PHOSPHATE SERPL-MCNC: 3 MG/DL (ref 2.5–4.9)
PLATELET # BLD AUTO: 103 K/UL (ref 135–450)
PLATELET BLD QL SMEAR: ABNORMAL
PMV BLD AUTO: 8.6 FL (ref 5–10.5)
POTASSIUM SERPL-SCNC: 3.4 MMOL/L (ref 3.5–5.1)
RBC # BLD AUTO: 4.02 M/UL (ref 4.2–5.9)
SLIDE REVIEW: ABNORMAL
SODIUM SERPL-SCNC: 142 MMOL/L (ref 136–145)
TRIGL SERPL-MCNC: 87 MG/DL (ref 0–150)
TSH SERPL DL<=0.005 MIU/L-ACNC: 0.6 UIU/ML (ref 0.27–4.2)
VARIANT LYMPHS NFR BLD MANUAL: 9 % (ref 0–6)
VLDLC SERPL CALC-MCNC: 17 MG/DL
WBC # BLD AUTO: 3.8 K/UL (ref 4–11)

## 2024-01-20 PROCEDURE — 6370000000 HC RX 637 (ALT 250 FOR IP): Performed by: STUDENT IN AN ORGANIZED HEALTH CARE EDUCATION/TRAINING PROGRAM

## 2024-01-20 PROCEDURE — 6360000002 HC RX W HCPCS: Performed by: STUDENT IN AN ORGANIZED HEALTH CARE EDUCATION/TRAINING PROGRAM

## 2024-01-20 PROCEDURE — 83735 ASSAY OF MAGNESIUM: CPT

## 2024-01-20 PROCEDURE — 85025 COMPLETE CBC W/AUTO DIFF WBC: CPT

## 2024-01-20 PROCEDURE — C9113 INJ PANTOPRAZOLE SODIUM, VIA: HCPCS | Performed by: STUDENT IN AN ORGANIZED HEALTH CARE EDUCATION/TRAINING PROGRAM

## 2024-01-20 PROCEDURE — 6370000000 HC RX 637 (ALT 250 FOR IP): Performed by: NURSE PRACTITIONER

## 2024-01-20 PROCEDURE — 80069 RENAL FUNCTION PANEL: CPT

## 2024-01-20 PROCEDURE — 6370000000 HC RX 637 (ALT 250 FOR IP): Performed by: INTERNAL MEDICINE

## 2024-01-20 PROCEDURE — 2000000000 HC ICU R&B

## 2024-01-20 PROCEDURE — 2580000003 HC RX 258: Performed by: STUDENT IN AN ORGANIZED HEALTH CARE EDUCATION/TRAINING PROGRAM

## 2024-01-20 PROCEDURE — 99232 SBSQ HOSP IP/OBS MODERATE 35: CPT | Performed by: STUDENT IN AN ORGANIZED HEALTH CARE EDUCATION/TRAINING PROGRAM

## 2024-01-20 RX ADMIN — OXYCODONE AND ACETAMINOPHEN 1 TABLET: 5; 325 TABLET ORAL at 08:53

## 2024-01-20 RX ADMIN — SPIRONOLACTONE 25 MG: 25 TABLET ORAL at 08:53

## 2024-01-20 RX ADMIN — Medication 10 ML: at 19:57

## 2024-01-20 RX ADMIN — ALPRAZOLAM 0.5 MG: 0.5 TABLET ORAL at 19:57

## 2024-01-20 RX ADMIN — LEVETIRACETAM 500 MG: 500 TABLET, FILM COATED ORAL at 19:57

## 2024-01-20 RX ADMIN — Medication 10 ML: at 08:54

## 2024-01-20 RX ADMIN — METOPROLOL SUCCINATE 50 MG: 50 TABLET, FILM COATED, EXTENDED RELEASE ORAL at 08:53

## 2024-01-20 RX ADMIN — LEVETIRACETAM 500 MG: 500 TABLET, FILM COATED ORAL at 08:54

## 2024-01-20 RX ADMIN — ALPRAZOLAM 0.5 MG: 0.5 TABLET ORAL at 02:53

## 2024-01-20 RX ADMIN — ASPIRIN 81 MG: 81 TABLET, COATED ORAL at 08:53

## 2024-01-20 RX ADMIN — SACUBITRIL AND VALSARTAN 1 TABLET: 24; 26 TABLET, FILM COATED ORAL at 08:54

## 2024-01-20 RX ADMIN — OXYCODONE AND ACETAMINOPHEN 1 TABLET: 5; 325 TABLET ORAL at 17:00

## 2024-01-20 RX ADMIN — ALPRAZOLAM 0.5 MG: 0.5 TABLET ORAL at 11:45

## 2024-01-20 RX ADMIN — PANTOPRAZOLE SODIUM 40 MG: 40 INJECTION, POWDER, FOR SOLUTION INTRAVENOUS at 08:54

## 2024-01-20 RX ADMIN — SACUBITRIL AND VALSARTAN 1 TABLET: 24; 26 TABLET, FILM COATED ORAL at 19:57

## 2024-01-20 RX ADMIN — DIGOXIN 125 MCG: 125 TABLET ORAL at 08:54

## 2024-01-20 RX ADMIN — EMPAGLIFLOZIN 10 MG: 10 TABLET, FILM COATED ORAL at 08:54

## 2024-01-20 ASSESSMENT — PAIN DESCRIPTION - PAIN TYPE
TYPE: CHRONIC PAIN

## 2024-01-20 ASSESSMENT — PAIN SCALES - GENERAL
PAINLEVEL_OUTOF10: 8
PAINLEVEL_OUTOF10: 6
PAINLEVEL_OUTOF10: 4
PAINLEVEL_OUTOF10: 4
PAINLEVEL_OUTOF10: 5
PAINLEVEL_OUTOF10: 8

## 2024-01-20 ASSESSMENT — PAIN DESCRIPTION - DESCRIPTORS
DESCRIPTORS: ACHING
DESCRIPTORS: ACHING

## 2024-01-20 ASSESSMENT — PAIN DESCRIPTION - ORIENTATION
ORIENTATION: LOWER
ORIENTATION: LOWER

## 2024-01-20 NOTE — PROCEDURES
Patient: Lazaro Resendiz    MR Number: [id number]  YOB: 1970  Date of Visit: 1-19-24      Clinical History:  The patient is a 53 y.o. years old male with possible recurrent seizures.       Method:  The EEG was performed utilizing the international 10/20 of electrode placements of both referential and bipolar montages. The patient was awake and drowsy through out the recording.  Photic stimulation was performed.    Findings:  The background of the EEG showed normal alpha posterior background of 9-10 HZ and amplitude of 20-40 UV. This background was symmetric, waxing and waning, and reactive with eye opening and closure. As the patient became drowsy, generalized diffuse slowing was seen through recording at 6-7 HZ.  This generalized slowing was symmetric, non rhythmical, and continuous. No spike or sharp waves were seen.  Photic stimulation did not activate EEG.     Impression:  This EEG  is within normal limits. There is no evidence of epileptiform discharges, focal, or lateralizing abnormalities.      Siri Huff MD      Board certified in clinical neurophysiology

## 2024-01-20 NOTE — FLOWSHEET NOTE
Pt requested assistance to bathroom, when this nurse went to check on pt room and restroom had strong cigarette odor, asked pt if he had smoked pt denied, stated\" it might be my jacket\" jacket has been on chair all day and odor was not present at beginning of shift until now. Educated pt that smoking was not permitted on premises and that is is also bad for pts health and condition.

## 2024-01-21 LAB
ALBUMIN SERPL-MCNC: 3.3 G/DL (ref 3.4–5)
ANION GAP SERPL CALCULATED.3IONS-SCNC: 5 MMOL/L (ref 3–16)
BASOPHILS # BLD: 0 K/UL (ref 0–0.2)
BASOPHILS NFR BLD: 1.3 %
BUN SERPL-MCNC: 9 MG/DL (ref 7–20)
CALCIUM SERPL-MCNC: 7.9 MG/DL (ref 8.3–10.6)
CHLORIDE SERPL-SCNC: 108 MMOL/L (ref 99–110)
CO2 SERPL-SCNC: 27 MMOL/L (ref 21–32)
CREAT SERPL-MCNC: 0.7 MG/DL (ref 0.9–1.3)
DEPRECATED RDW RBC AUTO: 14.1 % (ref 12.4–15.4)
EOSINOPHIL # BLD: 0.1 K/UL (ref 0–0.6)
EOSINOPHIL NFR BLD: 2.1 %
GFR SERPLBLD CREATININE-BSD FMLA CKD-EPI: >60 ML/MIN/{1.73_M2}
GLUCOSE SERPL-MCNC: 101 MG/DL (ref 70–99)
HCT VFR BLD AUTO: 36.3 % (ref 40.5–52.5)
HGB BLD-MCNC: 12.3 G/DL (ref 13.5–17.5)
LYMPHOCYTES # BLD: 1.3 K/UL (ref 1–5.1)
LYMPHOCYTES NFR BLD: 39.7 %
MAGNESIUM SERPL-MCNC: 1.7 MG/DL (ref 1.8–2.4)
MCH RBC QN AUTO: 30.9 PG (ref 26–34)
MCHC RBC AUTO-ENTMCNC: 33.9 G/DL (ref 31–36)
MCV RBC AUTO: 91.1 FL (ref 80–100)
MONOCYTES # BLD: 0.3 K/UL (ref 0–1.3)
MONOCYTES NFR BLD: 9.2 %
NEUTROPHILS # BLD: 1.6 K/UL (ref 1.7–7.7)
NEUTROPHILS NFR BLD: 47.7 %
PHOSPHATE SERPL-MCNC: 3.8 MG/DL (ref 2.5–4.9)
PLATELET # BLD AUTO: 104 K/UL (ref 135–450)
PMV BLD AUTO: 8.2 FL (ref 5–10.5)
POTASSIUM SERPL-SCNC: 3.6 MMOL/L (ref 3.5–5.1)
RBC # BLD AUTO: 3.99 M/UL (ref 4.2–5.9)
SODIUM SERPL-SCNC: 140 MMOL/L (ref 136–145)
WBC # BLD AUTO: 3.3 K/UL (ref 4–11)

## 2024-01-21 PROCEDURE — 85025 COMPLETE CBC W/AUTO DIFF WBC: CPT

## 2024-01-21 PROCEDURE — 1200000000 HC SEMI PRIVATE

## 2024-01-21 PROCEDURE — 6370000000 HC RX 637 (ALT 250 FOR IP): Performed by: STUDENT IN AN ORGANIZED HEALTH CARE EDUCATION/TRAINING PROGRAM

## 2024-01-21 PROCEDURE — 99232 SBSQ HOSP IP/OBS MODERATE 35: CPT | Performed by: STUDENT IN AN ORGANIZED HEALTH CARE EDUCATION/TRAINING PROGRAM

## 2024-01-21 PROCEDURE — 83735 ASSAY OF MAGNESIUM: CPT

## 2024-01-21 PROCEDURE — 6370000000 HC RX 637 (ALT 250 FOR IP): Performed by: NURSE PRACTITIONER

## 2024-01-21 PROCEDURE — 80069 RENAL FUNCTION PANEL: CPT

## 2024-01-21 PROCEDURE — 6370000000 HC RX 637 (ALT 250 FOR IP): Performed by: INTERNAL MEDICINE

## 2024-01-21 PROCEDURE — 2580000003 HC RX 258: Performed by: STUDENT IN AN ORGANIZED HEALTH CARE EDUCATION/TRAINING PROGRAM

## 2024-01-21 PROCEDURE — 6360000002 HC RX W HCPCS: Performed by: STUDENT IN AN ORGANIZED HEALTH CARE EDUCATION/TRAINING PROGRAM

## 2024-01-21 PROCEDURE — C9113 INJ PANTOPRAZOLE SODIUM, VIA: HCPCS | Performed by: STUDENT IN AN ORGANIZED HEALTH CARE EDUCATION/TRAINING PROGRAM

## 2024-01-21 RX ADMIN — Medication 10 ML: at 21:03

## 2024-01-21 RX ADMIN — ALPRAZOLAM 0.5 MG: 0.5 TABLET ORAL at 04:50

## 2024-01-21 RX ADMIN — ASPIRIN 81 MG: 81 TABLET, COATED ORAL at 08:57

## 2024-01-21 RX ADMIN — LEVETIRACETAM 500 MG: 500 TABLET, FILM COATED ORAL at 21:00

## 2024-01-21 RX ADMIN — OXYCODONE AND ACETAMINOPHEN 1 TABLET: 5; 325 TABLET ORAL at 08:56

## 2024-01-21 RX ADMIN — SPIRONOLACTONE 25 MG: 25 TABLET ORAL at 08:56

## 2024-01-21 RX ADMIN — PANTOPRAZOLE SODIUM 40 MG: 40 INJECTION, POWDER, FOR SOLUTION INTRAVENOUS at 08:57

## 2024-01-21 RX ADMIN — Medication 10 ML: at 08:57

## 2024-01-21 RX ADMIN — LEVETIRACETAM 500 MG: 500 TABLET, FILM COATED ORAL at 08:57

## 2024-01-21 RX ADMIN — SACUBITRIL AND VALSARTAN 1 TABLET: 24; 26 TABLET, FILM COATED ORAL at 21:01

## 2024-01-21 RX ADMIN — DIGOXIN 125 MCG: 125 TABLET ORAL at 08:57

## 2024-01-21 RX ADMIN — ALPRAZOLAM 0.5 MG: 0.5 TABLET ORAL at 11:49

## 2024-01-21 RX ADMIN — OXYCODONE AND ACETAMINOPHEN 1 TABLET: 5; 325 TABLET ORAL at 17:05

## 2024-01-21 RX ADMIN — METOPROLOL SUCCINATE 50 MG: 50 TABLET, FILM COATED, EXTENDED RELEASE ORAL at 08:56

## 2024-01-21 RX ADMIN — OXYCODONE AND ACETAMINOPHEN 1 TABLET: 5; 325 TABLET ORAL at 00:52

## 2024-01-21 RX ADMIN — EMPAGLIFLOZIN 10 MG: 10 TABLET, FILM COATED ORAL at 08:56

## 2024-01-21 RX ADMIN — ALPRAZOLAM 0.5 MG: 0.5 TABLET ORAL at 21:01

## 2024-01-21 RX ADMIN — SACUBITRIL AND VALSARTAN 1 TABLET: 24; 26 TABLET, FILM COATED ORAL at 08:57

## 2024-01-21 ASSESSMENT — PAIN DESCRIPTION - LOCATION
LOCATION: RIB CAGE
LOCATION: FLANK
LOCATION: FLANK

## 2024-01-21 ASSESSMENT — PAIN DESCRIPTION - ORIENTATION
ORIENTATION: LEFT

## 2024-01-21 ASSESSMENT — PAIN SCALES - GENERAL
PAINLEVEL_OUTOF10: 7
PAINLEVEL_OUTOF10: 5
PAINLEVEL_OUTOF10: 7

## 2024-01-21 ASSESSMENT — PAIN DESCRIPTION - DESCRIPTORS
DESCRIPTORS: DISCOMFORT
DESCRIPTORS: THROBBING
DESCRIPTORS: DISCOMFORT

## 2024-01-22 PROBLEM — I48.91 ATRIAL FIBRILLATION WITH RVR (HCC): Status: RESOLVED | Noted: 2024-01-18 | Resolved: 2024-01-22

## 2024-01-22 PROCEDURE — 6370000000 HC RX 637 (ALT 250 FOR IP): Performed by: NURSE PRACTITIONER

## 2024-01-22 PROCEDURE — 6370000000 HC RX 637 (ALT 250 FOR IP): Performed by: INTERNAL MEDICINE

## 2024-01-22 PROCEDURE — 2580000003 HC RX 258: Performed by: STUDENT IN AN ORGANIZED HEALTH CARE EDUCATION/TRAINING PROGRAM

## 2024-01-22 PROCEDURE — 1200000000 HC SEMI PRIVATE

## 2024-01-22 PROCEDURE — 97110 THERAPEUTIC EXERCISES: CPT

## 2024-01-22 PROCEDURE — 6370000000 HC RX 637 (ALT 250 FOR IP): Performed by: STUDENT IN AN ORGANIZED HEALTH CARE EDUCATION/TRAINING PROGRAM

## 2024-01-22 PROCEDURE — 97530 THERAPEUTIC ACTIVITIES: CPT

## 2024-01-22 PROCEDURE — C9113 INJ PANTOPRAZOLE SODIUM, VIA: HCPCS | Performed by: STUDENT IN AN ORGANIZED HEALTH CARE EDUCATION/TRAINING PROGRAM

## 2024-01-22 PROCEDURE — 6360000002 HC RX W HCPCS: Performed by: STUDENT IN AN ORGANIZED HEALTH CARE EDUCATION/TRAINING PROGRAM

## 2024-01-22 RX ORDER — HYDROXYZINE PAMOATE 25 MG/1
25 CAPSULE ORAL 3 TIMES DAILY PRN
Qty: 42 CAPSULE | Refills: 0 | Status: SHIPPED | OUTPATIENT
Start: 2024-01-22 | End: 2024-02-05

## 2024-01-22 RX ORDER — METOPROLOL SUCCINATE 50 MG/1
50 TABLET, EXTENDED RELEASE ORAL DAILY
Qty: 30 TABLET | Refills: 3 | Status: SHIPPED | OUTPATIENT
Start: 2024-01-22

## 2024-01-22 RX ORDER — LEVETIRACETAM 500 MG/1
500 TABLET ORAL 2 TIMES DAILY
Qty: 60 TABLET | Refills: 3 | Status: SHIPPED | OUTPATIENT
Start: 2024-01-22

## 2024-01-22 RX ORDER — SPIRONOLACTONE 25 MG/1
25 TABLET ORAL DAILY
Qty: 30 TABLET | Refills: 3 | Status: SHIPPED | OUTPATIENT
Start: 2024-01-22

## 2024-01-22 RX ORDER — ASPIRIN 81 MG/1
81 TABLET ORAL DAILY
Qty: 30 TABLET | Refills: 3 | Status: SHIPPED | OUTPATIENT
Start: 2024-01-22

## 2024-01-22 RX ORDER — DIGOXIN 125 MCG
125 TABLET ORAL DAILY
Qty: 30 TABLET | Refills: 3 | Status: SHIPPED | OUTPATIENT
Start: 2024-01-22

## 2024-01-22 RX ADMIN — Medication 10 ML: at 08:59

## 2024-01-22 RX ADMIN — PANTOPRAZOLE SODIUM 40 MG: 40 INJECTION, POWDER, FOR SOLUTION INTRAVENOUS at 08:59

## 2024-01-22 RX ADMIN — METOPROLOL SUCCINATE 50 MG: 50 TABLET, FILM COATED, EXTENDED RELEASE ORAL at 08:59

## 2024-01-22 RX ADMIN — DIGOXIN 125 MCG: 125 TABLET ORAL at 08:59

## 2024-01-22 RX ADMIN — ALPRAZOLAM 0.5 MG: 0.5 TABLET ORAL at 14:49

## 2024-01-22 RX ADMIN — SACUBITRIL AND VALSARTAN 1 TABLET: 24; 26 TABLET, FILM COATED ORAL at 20:51

## 2024-01-22 RX ADMIN — ALPRAZOLAM 0.5 MG: 0.5 TABLET ORAL at 23:08

## 2024-01-22 RX ADMIN — OXYCODONE AND ACETAMINOPHEN 1 TABLET: 5; 325 TABLET ORAL at 17:59

## 2024-01-22 RX ADMIN — EMPAGLIFLOZIN 10 MG: 10 TABLET, FILM COATED ORAL at 08:59

## 2024-01-22 RX ADMIN — ALPRAZOLAM 0.5 MG: 0.5 TABLET ORAL at 06:37

## 2024-01-22 RX ADMIN — OXYCODONE AND ACETAMINOPHEN 1 TABLET: 5; 325 TABLET ORAL at 09:04

## 2024-01-22 RX ADMIN — SACUBITRIL AND VALSARTAN 1 TABLET: 24; 26 TABLET, FILM COATED ORAL at 08:59

## 2024-01-22 RX ADMIN — OXYCODONE AND ACETAMINOPHEN 1 TABLET: 5; 325 TABLET ORAL at 00:50

## 2024-01-22 RX ADMIN — LEVETIRACETAM 500 MG: 500 TABLET, FILM COATED ORAL at 08:59

## 2024-01-22 RX ADMIN — Medication 10 ML: at 20:50

## 2024-01-22 RX ADMIN — LEVETIRACETAM 500 MG: 500 TABLET, FILM COATED ORAL at 20:51

## 2024-01-22 RX ADMIN — ASPIRIN 81 MG: 81 TABLET, COATED ORAL at 08:59

## 2024-01-22 RX ADMIN — SPIRONOLACTONE 25 MG: 25 TABLET ORAL at 08:59

## 2024-01-22 RX ADMIN — HYDROXYZINE PAMOATE 25 MG: 25 CAPSULE ORAL at 12:22

## 2024-01-22 ASSESSMENT — PAIN DESCRIPTION - LOCATION
LOCATION: CHEST
LOCATION: BACK
LOCATION: BACK

## 2024-01-22 ASSESSMENT — PAIN DESCRIPTION - DESCRIPTORS: DESCRIPTORS: ACHING;SORE

## 2024-01-22 ASSESSMENT — PAIN - FUNCTIONAL ASSESSMENT
PAIN_FUNCTIONAL_ASSESSMENT: ACTIVITIES ARE NOT PREVENTED
PAIN_FUNCTIONAL_ASSESSMENT: ACTIVITIES ARE NOT PREVENTED

## 2024-01-22 ASSESSMENT — PAIN SCALES - GENERAL
PAINLEVEL_OUTOF10: 8

## 2024-01-22 ASSESSMENT — PAIN DESCRIPTION - ORIENTATION
ORIENTATION: LEFT

## 2024-01-22 NOTE — CARE COORDINATION
Discharge Planning:     (JOSE LUIS) spoke with Gladis with [facility]. [facility] is able to accept. CM confirmed with the patient who is agreeable. Gladis will start the pre-cert today. Cm to follow.    Electronically signed by Vee Dahl on 1/22/24 at 1:30 PM EST

## 2024-01-23 VITALS
SYSTOLIC BLOOD PRESSURE: 107 MMHG | BODY MASS INDEX: 23.77 KG/M2 | HEIGHT: 74 IN | HEART RATE: 93 BPM | DIASTOLIC BLOOD PRESSURE: 66 MMHG | RESPIRATION RATE: 18 BRPM | WEIGHT: 185.19 LBS | TEMPERATURE: 97.7 F | OXYGEN SATURATION: 96 %

## 2024-01-23 LAB
ALBUMIN SERPL-MCNC: 3.8 G/DL (ref 3.4–5)
ANION GAP SERPL CALCULATED.3IONS-SCNC: 8 MMOL/L (ref 3–16)
BASOPHILS # BLD: 0 K/UL (ref 0–0.2)
BASOPHILS NFR BLD: 0.7 %
BUN SERPL-MCNC: 14 MG/DL (ref 7–20)
CALCIUM SERPL-MCNC: 9 MG/DL (ref 8.3–10.6)
CHLORIDE SERPL-SCNC: 106 MMOL/L (ref 99–110)
CO2 SERPL-SCNC: 26 MMOL/L (ref 21–32)
CREAT SERPL-MCNC: 0.8 MG/DL (ref 0.9–1.3)
DEPRECATED RDW RBC AUTO: 14.5 % (ref 12.4–15.4)
EOSINOPHIL # BLD: 0.1 K/UL (ref 0–0.6)
EOSINOPHIL NFR BLD: 2.8 %
GFR SERPLBLD CREATININE-BSD FMLA CKD-EPI: >60 ML/MIN/{1.73_M2}
GLUCOSE SERPL-MCNC: 102 MG/DL (ref 70–99)
HCT VFR BLD AUTO: 40.2 % (ref 40.5–52.5)
HGB BLD-MCNC: 13.7 G/DL (ref 13.5–17.5)
LYMPHOCYTES # BLD: 1.6 K/UL (ref 1–5.1)
LYMPHOCYTES NFR BLD: 33.2 %
MCH RBC QN AUTO: 31 PG (ref 26–34)
MCHC RBC AUTO-ENTMCNC: 34.2 G/DL (ref 31–36)
MCV RBC AUTO: 90.4 FL (ref 80–100)
MONOCYTES # BLD: 0.5 K/UL (ref 0–1.3)
MONOCYTES NFR BLD: 10 %
NEUTROPHILS # BLD: 2.6 K/UL (ref 1.7–7.7)
NEUTROPHILS NFR BLD: 53.3 %
PHOSPHATE SERPL-MCNC: 3.8 MG/DL (ref 2.5–4.9)
PLATELET # BLD AUTO: 121 K/UL (ref 135–450)
PMV BLD AUTO: 8.2 FL (ref 5–10.5)
POTASSIUM SERPL-SCNC: 4.1 MMOL/L (ref 3.5–5.1)
RBC # BLD AUTO: 4.44 M/UL (ref 4.2–5.9)
SODIUM SERPL-SCNC: 140 MMOL/L (ref 136–145)
WBC # BLD AUTO: 4.9 K/UL (ref 4–11)

## 2024-01-23 PROCEDURE — 97535 SELF CARE MNGMENT TRAINING: CPT

## 2024-01-23 PROCEDURE — 6370000000 HC RX 637 (ALT 250 FOR IP): Performed by: NURSE PRACTITIONER

## 2024-01-23 PROCEDURE — 6370000000 HC RX 637 (ALT 250 FOR IP): Performed by: INTERNAL MEDICINE

## 2024-01-23 PROCEDURE — 85025 COMPLETE CBC W/AUTO DIFF WBC: CPT

## 2024-01-23 PROCEDURE — 97110 THERAPEUTIC EXERCISES: CPT

## 2024-01-23 PROCEDURE — C9113 INJ PANTOPRAZOLE SODIUM, VIA: HCPCS | Performed by: STUDENT IN AN ORGANIZED HEALTH CARE EDUCATION/TRAINING PROGRAM

## 2024-01-23 PROCEDURE — 80069 RENAL FUNCTION PANEL: CPT

## 2024-01-23 PROCEDURE — 2580000003 HC RX 258: Performed by: STUDENT IN AN ORGANIZED HEALTH CARE EDUCATION/TRAINING PROGRAM

## 2024-01-23 PROCEDURE — 6370000000 HC RX 637 (ALT 250 FOR IP): Performed by: STUDENT IN AN ORGANIZED HEALTH CARE EDUCATION/TRAINING PROGRAM

## 2024-01-23 PROCEDURE — 6360000002 HC RX W HCPCS: Performed by: STUDENT IN AN ORGANIZED HEALTH CARE EDUCATION/TRAINING PROGRAM

## 2024-01-23 PROCEDURE — 36415 COLL VENOUS BLD VENIPUNCTURE: CPT

## 2024-01-23 PROCEDURE — 97530 THERAPEUTIC ACTIVITIES: CPT

## 2024-01-23 PROCEDURE — 97116 GAIT TRAINING THERAPY: CPT

## 2024-01-23 RX ADMIN — ALPRAZOLAM 0.5 MG: 0.5 TABLET ORAL at 18:02

## 2024-01-23 RX ADMIN — OXYCODONE AND ACETAMINOPHEN 1 TABLET: 5; 325 TABLET ORAL at 02:00

## 2024-01-23 RX ADMIN — OXYCODONE AND ACETAMINOPHEN 1 TABLET: 5; 325 TABLET ORAL at 10:56

## 2024-01-23 RX ADMIN — SACUBITRIL AND VALSARTAN 1 TABLET: 24; 26 TABLET, FILM COATED ORAL at 10:56

## 2024-01-23 RX ADMIN — SPIRONOLACTONE 25 MG: 25 TABLET ORAL at 10:56

## 2024-01-23 RX ADMIN — PANTOPRAZOLE SODIUM 40 MG: 40 INJECTION, POWDER, FOR SOLUTION INTRAVENOUS at 10:56

## 2024-01-23 RX ADMIN — EMPAGLIFLOZIN 10 MG: 10 TABLET, FILM COATED ORAL at 10:56

## 2024-01-23 RX ADMIN — DIGOXIN 125 MCG: 125 TABLET ORAL at 10:56

## 2024-01-23 RX ADMIN — ALPRAZOLAM 0.5 MG: 0.5 TABLET ORAL at 11:00

## 2024-01-23 RX ADMIN — ASPIRIN 81 MG: 81 TABLET, COATED ORAL at 10:56

## 2024-01-23 RX ADMIN — LEVETIRACETAM 500 MG: 500 TABLET, FILM COATED ORAL at 10:56

## 2024-01-23 RX ADMIN — METOPROLOL SUCCINATE 50 MG: 50 TABLET, FILM COATED, EXTENDED RELEASE ORAL at 10:56

## 2024-01-23 RX ADMIN — Medication 10 ML: at 10:56

## 2024-01-23 ASSESSMENT — PAIN DESCRIPTION - LOCATION
LOCATION: NECK
LOCATION: NECK;BACK

## 2024-01-23 ASSESSMENT — PAIN SCALES - GENERAL
PAINLEVEL_OUTOF10: 10
PAINLEVEL_OUTOF10: 10
PAINLEVEL_OUTOF10: 8

## 2024-01-23 ASSESSMENT — PAIN DESCRIPTION - DESCRIPTORS: DESCRIPTORS: ACHING;SORE

## 2024-01-23 ASSESSMENT — PAIN - FUNCTIONAL ASSESSMENT: PAIN_FUNCTIONAL_ASSESSMENT: ACTIVITIES ARE NOT PREVENTED

## 2024-01-23 ASSESSMENT — PAIN DESCRIPTION - ORIENTATION
ORIENTATION: LEFT
ORIENTATION: LEFT

## 2024-01-23 NOTE — CARE COORDINATION
CAMILO saw pt was accepted in [employer] system for wheelchair transportation, but no provider or time was indicated.  CAMILO called a number to [facility] [phone], which was given in [employer].  Rep looked up pt's case and stated that a company called [employer] accepted the ride.  She called the agency who stated they were here an hour ago but tried calling pt's number.  When no on answered, they left.  CAMILO stated that they were supposed to call the floor number to the nurses station which was given in the details of the booked trip.  Stated they did not have that number.  CAMILO provided number, [phone].  Stated they could have a  here in 20 minutes.  CAMILO informed RN.  Called Gladis at [facility] and informed of pt admitting tonight.  Faxed all paperwork to [phone].  All needs met per case management at this time.    Discharge Plan:  [address] [place] [address]  Phone: [phone]  Fax: [phone]    [employer] transportation at 6:15pm tonight.    Electronically signed by WALDO Tillman, RADHAW on 1/23/2024 at 6:02 PM

## 2024-01-23 NOTE — CARE COORDINATION
CAMILO called Gladis at [facility], [phone], who stated the precert was officially started this morning d/t needing a HENS completed before they could submit.  HENS was completed this morning.  Precert pending at this time.    Discharge Plan:  [facility] once precert is approved.    Electronically signed by WALDO Tillman, JESS on 1/23/2024 at 1:03 PM

## 2024-01-23 NOTE — PLAN OF CARE
Problem: Pain  Goal: Verbalizes/displays adequate comfort level or baseline comfort level  1/19/2024 2119 by Gabrielle Boland RN  Outcome: Progressing  1/19/2024 2119 by Gabrielle Boland RN  Outcome: Progressing  Flowsheets  Taken 1/19/2024 2100  Verbalizes/displays adequate comfort level or baseline comfort level:   Encourage patient to monitor pain and request assistance   Assess pain using appropriate pain scale   Administer analgesics based on type and severity of pain and evaluate response  Taken 1/19/2024 2045  Verbalizes/displays adequate comfort level or baseline comfort level:   Assess pain using appropriate pain scale   Encourage patient to monitor pain and request assistance   Administer analgesics based on type and severity of pain and evaluate response   Implement non-pharmacological measures as appropriate and evaluate response     Problem: Skin/Tissue Integrity  Goal: Absence of new skin breakdown  Description: 1.  Monitor for areas of redness and/or skin breakdown  2.  Assess vascular access sites hourly  3.  Every 4-6 hours minimum:  Change oxygen saturation probe site  4.  Every 4-6 hours:  If on nasal continuous positive airway pressure, respiratory therapy assess nares and determine need for appliance change or resting period.  1/19/2024 2119 by Gabrielle Boland RN  Outcome: Progressing  1/19/2024 2119 by Gabrielle Boland RN  Outcome: Progressing     Problem: Safety - Adult  Goal: Free from fall injury  1/19/2024 2119 by Gabrielle Boland RN  Outcome: Progressing  1/19/2024 2119 by Gabrielle Boland RN  Outcome: Progressing     Problem: ABCDS Injury Assessment  Goal: Absence of physical injury  1/19/2024 2119 by Gabrielle Boland RN  Outcome: Progressing  1/19/2024 2119 by Gabrielle Boland RN  Outcome: Progressing     Problem: Chronic Conditions and Co-morbidities  Goal: Patient's chronic conditions and co-morbidity symptoms are monitored and maintained or improved  1/19/2024 2119 by 
  Problem: Pain  Goal: Verbalizes/displays adequate comfort level or baseline comfort level  1/23/2024 0049 by Pallavi Cox RN  Outcome: Progressing  1/23/2024 0049 by Pallavi Cox RN  Outcome: Progressing  Flowsheets  Taken 1/23/2024 0015  Verbalizes/displays adequate comfort level or baseline comfort level:   Encourage patient to monitor pain and request assistance   Assess pain using appropriate pain scale   Implement non-pharmacological measures as appropriate and evaluate response   Administer analgesics based on type and severity of pain and evaluate response   Consider cultural and social influences on pain and pain management   Notify Licensed Independent Practitioner if interventions unsuccessful or patient reports new pain  Taken 1/22/2024 2045  Verbalizes/displays adequate comfort level or baseline comfort level:   Encourage patient to monitor pain and request assistance   Assess pain using appropriate pain scale   Administer analgesics based on type and severity of pain and evaluate response   Implement non-pharmacological measures as appropriate and evaluate response   Consider cultural and social influences on pain and pain management   Notify Licensed Independent Practitioner if interventions unsuccessful or patient reports new pain  Taken 1/22/2024 2030  Verbalizes/displays adequate comfort level or baseline comfort level:   Encourage patient to monitor pain and request assistance   Assess pain using appropriate pain scale   Administer analgesics based on type and severity of pain and evaluate response   Implement non-pharmacological measures as appropriate and evaluate response   Consider cultural and social influences on pain and pain management   Notify Licensed Independent Practitioner if interventions unsuccessful or patient reports new pain     Problem: Skin/Tissue Integrity  Goal: Absence of new skin breakdown  Description: 1.  Monitor for areas of redness and/or skin breakdown  2.  Assess 
  Problem: Pain  Goal: Verbalizes/displays adequate comfort level or baseline comfort level  Outcome: Not Progressing     Problem: Skin/Tissue Integrity  Goal: Absence of new skin breakdown  Description: 1.  Monitor for areas of redness and/or skin breakdown  2.  Assess vascular access sites hourly  3.  Every 4-6 hours minimum:  Change oxygen saturation probe site  4.  Every 4-6 hours:  If on nasal continuous positive airway pressure, respiratory therapy assess nares and determine need for appliance change or resting period.  Outcome: Progressing     Problem: Safety - Adult  Goal: Free from fall injury  Outcome: Progressing     Problem: ABCDS Injury Assessment  Goal: Absence of physical injury  Outcome: Progressing     Problem: Chronic Conditions and Co-morbidities  Goal: Patient's chronic conditions and co-morbidity symptoms are monitored and maintained or improved  Outcome: Progressing     Problem: Pain  Goal: Verbalizes/displays adequate comfort level or baseline comfort level  Outcome: Not Progressing     
  Problem: Pain  Goal: Verbalizes/displays adequate comfort level or baseline comfort level  Outcome: Progressing     Problem: Skin/Tissue Integrity  Goal: Absence of new skin breakdown  Description: 1.  Monitor for areas of redness and/or skin breakdown  2.  Assess vascular access sites hourly  3.  Every 4-6 hours minimum:  Change oxygen saturation probe site  4.  Every 4-6 hours:  If on nasal continuous positive airway pressure, respiratory therapy assess nares and determine need for appliance change or resting period.  Outcome: Progressing     
  Problem: Pain  Goal: Verbalizes/displays adequate comfort level or baseline comfort level  Outcome: Progressing  Flowsheets  Taken 1/19/2024 2100  Verbalizes/displays adequate comfort level or baseline comfort level:   Encourage patient to monitor pain and request assistance   Assess pain using appropriate pain scale   Administer analgesics based on type and severity of pain and evaluate response  Taken 1/19/2024 2045  Verbalizes/displays adequate comfort level or baseline comfort level:   Assess pain using appropriate pain scale   Encourage patient to monitor pain and request assistance   Administer analgesics based on type and severity of pain and evaluate response   Implement non-pharmacological measures as appropriate and evaluate response     Problem: Skin/Tissue Integrity  Goal: Absence of new skin breakdown  Description: 1.  Monitor for areas of redness and/or skin breakdown  2.  Assess vascular access sites hourly  3.  Every 4-6 hours minimum:  Change oxygen saturation probe site  4.  Every 4-6 hours:  If on nasal continuous positive airway pressure, respiratory therapy assess nares and determine need for appliance change or resting period.  Outcome: Progressing     Problem: Safety - Adult  Goal: Free from fall injury  Outcome: Progressing     Problem: ABCDS Injury Assessment  Goal: Absence of physical injury  Outcome: Progressing     Problem: Chronic Conditions and Co-morbidities  Goal: Patient's chronic conditions and co-morbidity symptoms are monitored and maintained or improved  Outcome: Progressing  Flowsheets (Taken 1/19/2024 2045)  Care Plan - Patient's Chronic Conditions and Co-Morbidity Symptoms are Monitored and Maintained or Improved:   Monitor and assess patient's chronic conditions and comorbid symptoms for stability, deterioration, or improvement   Collaborate with multidisciplinary team to address chronic and comorbid conditions and prevent exacerbation or deterioration   Update acute care 
Possible d/c to [facility]. Pending appropriate transport method by wheelchair. PCT 5/325 and Xanax 0.5 @ 1100.  Problem: Pain  Goal: Verbalizes/displays adequate comfort level or baseline comfort level  Outcome: Progressing  Flowsheets (Taken 1/23/2024 1045)  Verbalizes/displays adequate comfort level or baseline comfort level:   Encourage patient to monitor pain and request assistance   Assess pain using appropriate pain scale   Administer analgesics based on type and severity of pain and evaluate response   Implement non-pharmacological measures as appropriate and evaluate response     Problem: Skin/Tissue Integrity  Goal: Absence of new skin breakdown  Description: 1.  Monitor for areas of redness and/or skin breakdown  2.  Assess vascular access sites hourly  3.  Every 4-6 hours minimum:  Change oxygen saturation probe site  4.  Every 4-6 hours:  If on nasal continuous positive airway pressure, respiratory therapy assess nares and determine need for appliance change or resting period.  Outcome: Progressing     Problem: Safety - Adult  Goal: Free from fall injury  Outcome: Progressing     Problem: ABCDS Injury Assessment  Goal: Absence of physical injury  Outcome: Progressing     Problem: Chronic Conditions and Co-morbidities  Goal: Patient's chronic conditions and co-morbidity symptoms are monitored and maintained or improved  Outcome: Progressing     
exacerbated and prevent overall improvement and discharge  1/19/2024 2119 by Gabrielle Boland, RN  Outcome: Progressing  1/19/2024 2119 by Gabrielle Boland, RN  Outcome: Progressing  Flowsheets (Taken 1/19/2024 2045)  Care Plan - Patient's Chronic Conditions and Co-Morbidity Symptoms are Monitored and Maintained or Improved:   Monitor and assess patient's chronic conditions and comorbid symptoms for stability, deterioration, or improvement   Collaborate with multidisciplinary team to address chronic and comorbid conditions and prevent exacerbation or deterioration   Update acute care plan with appropriate goals if chronic or comorbid symptoms are exacerbated and prevent overall improvement and discharge

## 2024-01-23 NOTE — PROGRESS NOTES
Hospitalist Progress Note    Name:  Lazaro Resendiz    /Age/Sex: 1970  (53 y.o. male)  MRN & CSN:  [id number] & [id number]    PCP: Daniel Amador DO    Date of Admission: 2024    Patient Status:  Inpatient     Chief Complaint:   Chief Complaint   Patient presents with    Chest Pain     FF EMS from home d/t chest pain x 3 days. Per ems, pt was in a fib with HR of 180s. Ems gave 6mg adenosine with no return to sinus rhythm. HX of HF, a-fib, seizures        Hospital Course:   Patient admitted for seizure at home.  Found to be in A-fib RVR on admission.  Started on esmolol drip.  Cardiology, EP, and neurology consulted.    Weaned off esmolol drip. Eliquis stopped as patient was deemed too high risk for being on anticoagulation given his history of frequent falls.    Patient is working on his housing situation.    Subjective:  Today is:  Hospital Day: 6.  Patient seen and examined in [id number].     In bed. Overall feels better. Denies pain.      Medications:  Reviewed    Infusion Medications    sodium chloride Stopped (24 7917)     Scheduled Medications    metoprolol succinate  50 mg Oral Daily    spironolactone  25 mg Oral Daily    empagliflozin  10 mg Oral Daily    digoxin  125 mcg Oral Daily    aspirin  81 mg Oral Daily    sacubitril-valsartan  1 tablet Oral BID    levETIRAcetam  500 mg Oral BID    sodium chloride flush  5-40 mL IntraVENous 2 times per day    pantoprazole  40 mg IntraVENous Daily     PRN Meds: hydrOXYzine pamoate, oxyCODONE-acetaminophen, perflutren lipid microspheres, sodium chloride flush, sodium chloride, potassium chloride **OR** potassium chloride, magnesium sulfate, ondansetron **OR** ondansetron, polyethylene glycol, acetaminophen **OR** acetaminophen, sodium phosphate 15 mmol in sodium chloride 0.9 % 250 mL IVPB, ALPRAZolam      Intake/Output Summary (Last 24 hours) at 2024 1710  Last data filed at 2024 1217  Gross per 24 hour   Intake 240 ml 
      Hospitalist Progress Note    Name:  Lazaro Resendiz    /Age/Sex: 1970  (53 y.o. male)  MRN & CSN:  [id number] & [id number]    PCP: Daniel Amador DO    Date of Admission: 2024    Patient Status:  Inpatient     Chief Complaint:   Chief Complaint   Patient presents with    Chest Pain     FF EMS from home d/t chest pain x 3 days. Per ems, pt was in a fib with HR of 180s. Ems gave 6mg adenosine with no return to sinus rhythm. HX of HF, a-fib, seizures        Hospital Course:   Patient admitted for seizure at home.  Found to be in A-fib RVR on admission.  Started on esmolol drip.  Cardiology, EP, and neurology consulted.    Weaned off esmolol drip. Eliquis stopped as patient was deemed too high risk for being on anticoagulation given his history of frequent falls.    Subjective:  Today is:  Hospital Day: 3.  Patient seen and examined in [id number].     In bed. Pain has improved today. Overall feels better.      Medications:  Reviewed    Infusion Medications    sodium chloride Stopped (24 1157)     Scheduled Medications    metoprolol succinate  50 mg Oral Daily    spironolactone  25 mg Oral Daily    empagliflozin  10 mg Oral Daily    digoxin  125 mcg Oral Daily    aspirin  81 mg Oral Daily    sacubitril-valsartan  1 tablet Oral BID    levETIRAcetam  500 mg Oral BID    sodium chloride flush  5-40 mL IntraVENous 2 times per day    pantoprazole  40 mg IntraVENous Daily     PRN Meds: hydrOXYzine pamoate, oxyCODONE-acetaminophen, perflutren lipid microspheres, sodium chloride flush, sodium chloride, potassium chloride **OR** potassium chloride, magnesium sulfate, ondansetron **OR** ondansetron, polyethylene glycol, acetaminophen **OR** acetaminophen, sodium phosphate 15 mmol in sodium chloride 0.9 % 250 mL IVPB, ALPRAZolam      Intake/Output Summary (Last 24 hours) at 2024 1129  Last data filed at 2024 0930  Gross per 24 hour   Intake 1372.88 ml   Output 2050 ml   Net -677.12 ml 
  [facility] - Inpatient Rehabilitation Department   Phone: [phone]    Physical Therapy    [] Initial Evaluation            [x] Daily Treatment Note         [] Discharge Summary      Patient: Lazaro Resendiz   : 1970   MRN: [id number]   Date of Service:  2024  Admitting Diagnosis: Persistent atrial fibrillation (HCC)  Current Admission Summary: Pt is a 53 y.o. male with PMHx of Afib, seizures, CHF who presented to [facility] with complaints of chest pain.    Past Medical History:  has a past medical history of Atrial fibrillation (HCC), Back pain, CHF (congestive heart failure) (HCC), OCD (obsessive compulsive disorder), and Seizures (HCC).  Past Surgical History:  has a past surgical history that includes Knee arthroscopy (Bilateral) and Total shoulder arthroplasty (Left).    Discharge Recommendations: Lazaro Resendiz scored a 20/24 on the AM-PAC short mobility form. Current research shows that an AM-PAC score of 17 or less is typically not associated with a discharge to the patient's home setting. Based on the patient's AM-PAC score and their current functional mobility deficits, it is recommended that the patient have 3-5 sessions per week of Physical Therapy at d/c to increase the patient's independence.  Please see assessment section for further patient specific details.    If patient discharges prior to next session this note will serve as a discharge summary.  Please see below for the latest assessment towards goals.      DME Required For Discharge: DME to be determined at next level of care  Precautions/Restrictions: high fall risk, hx of seizures  Weight Bearing Restrictions: no restrictions    Required Braces/Orthotics: no braces required  Positional Restrictions:no positional restrictions    Pre-Admission Information   Home Layout: one level, able to live on main level ?  Home Access: level entry  Bathroom Layout:  \"could be either one\"  Bathroom Equipment:  ?  Toilet Height: standard 
  [facility] - Inpatient Rehabilitation Department   Phone: [phone]    Occupational Therapy    [x] Initial Evaluation            [] Daily Treatment Note         [] Discharge Summary      Patient: Lazaro Resendiz   : 1970   MRN: [id number]   Date of Service:  2024    Admitting Diagnosis:  Atrial fibrillation with RVR (HCC)  Current Admission Summary: Lazaro Resendiz is a 53 y.o. male who presents to the emergency department with weakness, shortness of breath and left-sided chest pain ongoing for 3 days.  He feels himself in atrial fibrillation.  He is not compliant with metoprolol or his blood thinner.  EMS reported heart rate of 180s and gave a dose of adenosine without change.  Patient endorses leg swelling yesterday, improved today.  He does not have an ICD in place. He denies fevers or chills.    -recent L cephalomedullary nail on , WBAT   Past Medical History:  has a past medical history of Atrial fibrillation (HCC), Back pain, CHF (congestive heart failure) (HCC), OCD (obsessive compulsive disorder), and Seizures (HCC).  Past Surgical History:  has a past surgical history that includes Knee arthroscopy (Bilateral) and Total shoulder arthroplasty (Left).    Discharge Recommendations: Lazaro Resendiz scored a 19/24 on the AM-PAC ADL Inpatient form. Current research shows that an AM-PAC score of 17 or less is typically not associated with a discharge to the patient's home setting. Based on the patient's AM-PAC score and their current ADL deficits, it is recommended that the patient have 3-5 sessions per week of Occupational Therapy at d/c to increase the patient's independence.  Please see assessment section for further patient specific details.    If patient discharges prior to next session this note will serve as a discharge summary.  Please see below for the latest assessment towards goals.      DME Required For Discharge: DME to be determined at next level of care    Precautions/Restrictions: high 
  [facility] - Inpatient Rehabilitation Department   Phone: [phone]    Physical Therapy    [] Initial Evaluation            [x] Daily Treatment Note         [] Discharge Summary      Patient: Lazaro Resendiz   : 1970   MRN: [id number]   Date of Service:  2024  Admitting Diagnosis: Persistent atrial fibrillation (HCC)  Current Admission Summary: Pt is a 53 y.o. male with PMHx of Afib, seizures, CHF who presented to [facility] with complaints of chest pain.    Past Medical History:  has a past medical history of Atrial fibrillation (HCC), Back pain, CHF (congestive heart failure) (HCC), OCD (obsessive compulsive disorder), and Seizures (HCC).  Past Surgical History:  has a past surgical history that includes Knee arthroscopy (Bilateral) and Total shoulder arthroplasty (Left).    Discharge Recommendations: Lazaro Resendiz scored a 17/24 on the AM-PAC short mobility form. Current research shows that an AM-PAC score of 17 or less is typically not associated with a discharge to the patient's home setting. Based on the patient's AM-PAC score and their current functional mobility deficits, it is recommended that the patient have 3-5 sessions per week of Physical Therapy at d/c to increase the patient's independence.  Please see assessment section for further patient specific details.    If patient discharges prior to next session this note will serve as a discharge summary.  Please see below for the latest assessment towards goals.      DME Required For Discharge: DME to be determined at next level of care  Precautions/Restrictions: high fall risk, hx of seizures  Weight Bearing Restrictions: no restrictions    Required Braces/Orthotics: no braces required  Positional Restrictions:no positional restrictions    Pre-Admission Information   Home Layout: one level, able to live on main level ?  Home Access: level entry  Bathroom Layout:  \"could be either one\"  Bathroom Equipment:  ?  Toilet Height: standard 
  [facility] Daily Progress Note      Admit Date:  1/18/2024    Chief Complaint:  Chest pain, tachycardia    Subjective:  Mr. Resendiz complains of face pain. States this is 2/2 recent fall and seizure. He says he falls almost every single day. He frequently hits his head as well. He also reports seizures nearly every other day. His hx around these falls is limited. Does not tell me much for prodromal symptoms. Occasionally feels dizzy and sits down, but not always the case. His concern today is mostly about where he will go after discharge    Objective:   /80   Pulse 85   Temp 97.2 °F (36.2 °C) (Temporal)   Resp 20   Ht 1.88 m (6' 2.02\")   Wt 84.4 kg (186 lb 1.6 oz)   SpO2 97%   BMI 23.88 kg/m²     Intake/Output Summary (Last 24 hours) at 1/20/2024 0817  Last data filed at 1/20/2024 0400  Gross per 24 hour   Intake 1654.16 ml   Output 1775 ml   Net -120.84 ml       Physical Exam:  General:  Awake, alert, NAD  Skin:  Warm and dry  Neck:  JVD not visibile flat  Chest:  CTAB, Comfortable on room air  Cardiovascular:  Irregular, S1S2, no S3, No murmur  Abdomen:  Soft, ND, NT, No HSM  Extremities:  No edema    Medications:    metoprolol succinate  50 mg Oral Daily    spironolactone  25 mg Oral Daily    empagliflozin  10 mg Oral Daily    digoxin  125 mcg Oral Daily    aspirin  81 mg Oral Daily    sacubitril-valsartan  1 tablet Oral BID    levETIRAcetam  500 mg Oral BID    sodium chloride flush  5-40 mL IntraVENous 2 times per day    pantoprazole  40 mg IntraVENous Daily      sodium chloride Stopped (01/19/24 1157)     hydrOXYzine pamoate, oxyCODONE-acetaminophen, perflutren lipid microspheres, sodium chloride flush, sodium chloride, potassium chloride **OR** potassium chloride, magnesium sulfate, ondansetron **OR** ondansetron, polyethylene glycol, acetaminophen **OR** acetaminophen, sodium phosphate 15 mmol in sodium chloride 0.9 % 250 mL IVPB, ALPRAZolam    TELEMETRY (Personally reviewed by me): 
4 Eyes Skin Assessment     NAME:  Lazaro Resendiz  YOB: 1970  MEDICAL RECORD NUMBER:  [id number]    The patient is being assessed for  Admission    I agree that at least one RN has performed a thorough Head to Toe Skin Assessment on the patient. ALL assessment sites listed below have been assessed.      Areas assessed by both nurses:    Head, Face, Ears, Shoulders, Back, Chest, Arms, Elbows, Hands, Sacrum. Buttock, Coccyx, Ischium, Legs. Feet and Heels, Under Medical Devices , and Other ***        Does the Patient have a Wound? No noted wound(s)       Orlin Prevention initiated by RN: Yes  Wound Care Orders initiated by RN: No    Pressure Injury (Stage 3,4, Unstageable, DTI, NWPT, and Complex wounds) if present, place Wound referral order by RN under : No    New Ostomies, if present place, Ostomy referral order under : No     Nurse 1 eSignature: Electronically signed by Katherine Kurtz RN on 1/21/24 at 5:18 PM EST    **SHARE this note so that the co-signing nurse can place an eSignature**    Nurse 2 eSignature: {Esignature:[id number]}   
Dr Urbano at bedside at this time, plan to medical manage to have Cardiology to assess patient for further treatment. Electronically signed by Valerie Davis RN on 1/19/2024 at 8:12 AM    
Patient has arrived to unit in stable condition. Vitals stable. Patient is awake, alert and oriented. Pt requesting pain medication.  Respirations are easy and unlabored. Patient does not appear to be in distress. Patient oriented to room and call light. Plan of care discussed with patient, patient agreeable. Call light within reach.    
Pt asked for a shower, but refused to wait for PCA. This nurse educated pt on the fact that he's a fall risk and needs to wait for someone to be in the room with him. Pt stated he does not want to wait for anyone and proceeded to go take a shower on his own. Pt currently in shower without any assistance. Educated pt on pulling tab when he needs assistance.   
 SpO2 98%   BMI 23.43 kg/m²     General appearance: No apparent distress, appears stated age and cooperative.   HEENT: Pupils equal, round, and reactive to light. Conjunctivae/corneas clear.  Neck: Supple, with full range of motion. No jugular venous distention. Trachea midline.  Respiratory:  Normal respiratory effort. Clear to auscultation, bilaterally without Rales/Wheezes/Rhonchi.  Cardiovascular: Regular rate and rhythm with normal S1/S2 without murmurs, rubs or gallops. No peripheral edema.  Abdomen: Soft, non-tender, non-distended with normal bowel sounds.  : No CVA tenderness.  Musculoskeletal: No clubbing or cyanosis.  Full range of motion without deformity.  Skin: Skin color, texture, turgor normal.  No rashes or lesions.  Neurologic:  Neurovascularly intact without any focal sensory/motor deficits. Cranial nerves: II-XII intact, grossly non-focal.  Psychiatric: Alert and oriented, thought content appropriate, normal insight  Capillary Refill: Brisk, 3 seconds, normal   Peripheral Pulses: +2 palpable, equal bilaterally       Labs:   Recent Labs     01/18/24  1319 01/19/24  0335   WBC 6.2 3.8*   HGB 13.5 11.8*   HCT 39.6* 35.4*    118*     Recent Labs     01/18/24  1319 01/19/24  0335    143   K 3.4* 3.9    108   CO2 22 26   BUN 11 11   CREATININE 0.7* 0.7*   CALCIUM 8.8 8.2*   PHOS  --  3.2     Recent Labs     01/18/24  1319   *   *   BILITOT 1.8*   ALKPHOS 152*     Recent Labs     01/18/24  1319   INR 1.30*     No results for input(s): \"CKTOTAL\", \"TROPONINI\" in the last 72 hours.    Urinalysis:      Lab Results   Component Value Date/Time    NITRU Negative 10/19/2013 06:10 AM    WBCUA 0-2 10/19/2013 06:10 AM    BACTERIA 1+ 10/19/2013 06:10 AM    RBCUA 10-20 10/19/2013 06:10 AM    BLOODU MODERATE 10/19/2013 06:10 AM    SPECGRAV 1.025 10/19/2013 06:10 AM    GLUCOSEU 100 10/19/2013 06:10 AM       Radiology:  CT HEAD WO CONTRAST   Final Result   No acute intracranial 
01/19/24  0335 01/20/24  0420 01/21/24  0441    142 140   K 3.9 3.4* 3.6    110 108   CO2 26 25 27   PHOS 3.2 3.0 3.8   BUN 11 7 9   CREATININE 0.7* 0.6* 0.7*     LIVER PROFILE:   Recent Labs     01/18/24  1319   *   *   BILITOT 1.8*   ALKPHOS 152*     PT/INR:   Recent Labs     01/18/24  1319   PROTIME 16.2*   INR 1.30*     APTT: No results for input(s): \"APTT\" in the last 72 hours.  BNP:  No results for input(s): \"BNP\" in the last 72 hours.    Imaging/Procedures:   Cath: 2020 with non-obs CAD     ECHO 1/19/2024   Summary   Left ventricular size is normal.   Global left ventricular function is severely decreased with ejection   fraction estimated from 25 % to 30 %.    No regional wall motion abnormalities are noted.   Normal left ventricular wall thickness.   Unable to determine diastolic function due to atrial fibrillation.   Normal function of all valves.Mild mitral regurgitation    ECHO 6/8/2023 at [place]  SUMMARY:   1. Left ventricle: The cavity size was normal. Wall thickness was      normal. Systolic function was severly reduced. The estimated      ejection fraction was in the range of 25% to 30%. Hypokinesis of      the entire myocardium. Features are consistent with a      pseudonormal left ventricular filling pattern, with concomitant      abnormal relaxation and increased filling pressure (grade 2      diastolic dysfunction).   2. Aortic valve: Peak velocity (S): 1.18m/sec. Mean gradient (S):      6mm Hg. VTI ratio of LVOT to aortic valve: 0.63. Valve area      (VTI): 1.97cm^2.   3. Mitral valve: There was moderate regurgitation.   4. Right ventricle: The cavity size was normal. Wall thickness was      normal. Systolic function was mildly reduced.   5. Right atrium: The atrium was mildly dilated.   6. Tricuspid valve: There was mild-moderate regurgitation.   7. Pulmonary arteries: Estimated PA peak pressure is 36mm Hg (S).   8. Pericardium, extracardiac: There was no 
06:10 AM    BLOODU MODERATE 10/19/2013 06:10 AM    SPECGRAV 1.025 10/19/2013 06:10 AM    GLUCOSEU 100 10/19/2013 06:10 AM       Radiology:  CT HEAD WO CONTRAST   Final Result   No acute intracranial abnormality.         XR CHEST PORTABLE   Final Result   No acute cardiopulmonary disease.                 Assessment/Plan:    Active Hospital Problems    Diagnosis     Noncompliance with medications [Z91.148]     HTN (hypertension), benign [I10]     HFrEF (heart failure with reduced ejection fraction) ([facility]) [I50.20]     Seizure disorder ([facility]) [G40.909]     Persistent atrial fibrillation ([facility]) [I48.19]     Polysubstance abuse ([facility]) [F19.10]     Coronary artery disease involving native coronary artery of native heart without angina pectoris [I25.10]     Chronic diastolic heart failure ([facility]) [I50.32]          Hospital Day: 5    This is a 53 y.o. male who presented to [facility] on 1/18/2024 and is being treated for:    Chest pain  Afib RVR - resolved  -EKG shows Afib RVR; no ST changes  -CXR nonacute  -Off esmolol drip  -Continue home digoxin  -Eliquis stopped as patient was deemed too high risk for being on anticoagulation given his history of frequent falls  -Daily labs; replace electrolytes as needed  -Cardiology consulted     HFrEF  Chronic diastolic HF  -Not in an acute exacerbation  -Echo on 6/8/23 shows LVEF 25-30% with G2DD; hypokinesis of entire myocardium  -Repeat echo shows LVEF 25-30%  -Continue home entresto  -Continue jardiance  -I/Os     Seizures  -Continue home keppra  -Keppra level ordered  -Neurology consulted      Discussed management of the case with EP who recommended esmolol drip    Drugs that require monitoring for toxicity include:  keppra  and the method of monitoring was/is  keppra level    DVT ppx: Oral anticoagulation - eliquis  GI ppx: PPI  Diet: ADULT DIET; Regular  Code Status: Full Code    PT/OT Eval Status: Ordered    Disposition:  Off esmolol drip. Ready for discharge. Awaiting precert 
to/from stand from chair and bed with RW  Ambulation:  Surface:level surface  Assistive Device: rolling walker  Assistance: contact guard assistance  Distance: 12' + 12'  Gait Mechanics: Pt demos partial step through pattern, verbal cues for approximation to RW  Comments: Pt demos increased anxiety upon upright and with OOB mobility  Stair Mobility:  Stair mobility not completed on this date.  Comments:  Wheelchair Mobility:  No w/c mobility completed on this date.  Comments:  Balance:  Static Sitting Balance: good: independent with functional balance in unsupported position  Dynamic Sitting Balance: fair (+): maintains balance at SBA/supervision without use of UE support  Static Standing Balance: fair (-): maintains balance at CGA with use of UE support  Dynamic Standing Balance: fair (-): maintains balance at CGA with use of UE support  Comments:    Other Therapeutic Interventions  Pre-gait standing weight shifts x5 B, Pre-gait standing marches x4  Sitting: hip marches x10B, LAQs x10 B, ankle pumps x10 B  Pt also completed ADLs with OT  Functional Outcomes  AM-PAC Inpatient Mobility Raw Score : 16              Cognition  Overall Cognitive Status: Impaired, impaired recall   Orientation:    alert and oriented x 4  Command Following:   accurately follows one step commands    Education  Barriers To Learning: cognition and physical  Patient Education: patient educated on PT role and benefits, plan of care, precautions, general safety, functional mobility training, proper use of assistive device/equipment, transfer training, discharge recommendations  Learning Assessment:  patient verbalizes understanding, would benefit from continued reinforcement    Assessment  Activity Tolerance: Fair: pt limited by decreased endurance, balance, and functional mobility, with increased anxiety and hx of seizures  Vitals: BP: 93/80 (sitting EOB prior to stand pivot transfer), 98/82 (sitting in chair following stand pivot transfer), 
- eliquis  GI ppx: PPI  Diet: ADULT DIET; Regular  Code Status: Full Code    PT/OT Eval Status: Ordered    Disposition:  Off esmolol drip. Okay to transfer out of ICU. Working on discharge planning given his inconsistent housing situation. Hopeful for discharge tomorrow.        [place],   1/21/2024  12:09 PM    
cognition, decreased endurance, decreased sensation, decreased balance, decreased IADL, decreased fine motor control, decreased coordination, increased pain  Prognosis: guarded  Clinical Assessment: pt is a 54 yo M who presents significantly below baseline at [facility] CVU w/ afib w/ RVR. Pt limited by decreased balance, endurance, and activity tolerance. Pt is unsafe to d/c home at this time d/t frequent falls. Skilled OT warranted to maximize safety and independence in ADLs. Cont per POC  Safety Interventions: patient left in bed, call light within reach, nurse notified, and pt refusing bed alarm    Plan  Frequency: 3-5 x/per week  Current Treatment Recommendations: balance training, functional mobility training, transfer training, endurance training, modalities, patient/caregiver education, ADL/self-care training, IADL training, pain management, home exercise program, safety education, and positioning    Goals  Patient Goals: go to a nursing home   Short Term Goals:  Time Frame: upon d/c  Patient will complete lower body ADL at modified independent   Patient will complete toileting at modified independent   Patient will complete functional transfers at [facility] independent   Patient will complete functional mobility at modified independent   Patient will increase [facility] ADL score = to or > than 22/24    Above goals reviewed on 1/23/2024.  All goals are ongoing at this time unless indicated above.       Therapy Session Time     Individual Group Co-treatment   Time In 1415     Time Out 1508     Minutes 53          Timed Code Treatment Minutes:  Timed Code Treatment Minutes: 53 Minutes      Total Treatment Minutes:  53 minutes        Electronically Signed By: Keysha Wu OT, Keysha Wu OTR/L [id number]

## 2024-01-23 NOTE — CARE COORDINATION
SW received a call back from Gladis at [facility].  Pt's precert was approved.  Reviewed pt's chart.  He does not qualify for stretcher transport.  Met with pt who reported he does not have anyone to give him a ride.  Facility also does not have a van to help transport.  SW attempting to set up wheelchair transport through pt's insurance via [employer].  Transportation currently pending.    Electronically signed by WALDO Tilmlan, JESS on 1/23/2024 at 3:38 PM

## 2024-01-23 NOTE — DISCHARGE INSTR - COC
[facility]  Address:  [facility] [place] [address]  Phone:  [phone]  Fax:  [phone]    / signature: {Esignature:[id number]}    PHYSICIAN SECTION    Prognosis: {Prognosis:2636688952}    Condition at Discharge: { Patient Condition:131332856}    Rehab Potential (if transferring to Rehab): {Prognosis:7746768756}    Recommended Labs or Other Treatments After Discharge: ***    Physician Certification: I certify the above information and transfer of Lazaro Resendiz  is necessary for the continuing treatment of the diagnosis listed and that he requires {Admit to Appropriate Level of Care:11104} for {GREATER/LESS:417647048} 30 days.     Update Admission H&P: {CHP DME Changes in HandP:036318802}    PHYSICIAN SIGNATURE:  {Esignature:[id number]}

## 2024-08-04 ENCOUNTER — APPOINTMENT (OUTPATIENT)
Dept: GENERAL RADIOLOGY | Age: 54
End: 2024-08-04
Payer: MEDICAID

## 2024-08-04 ENCOUNTER — HOSPITAL ENCOUNTER (EMERGENCY)
Age: 54
Discharge: HOME | End: 2024-08-04
Attending: EMERGENCY MEDICINE
Payer: MEDICAID

## 2024-08-04 VITALS
HEART RATE: 89 BPM | DIASTOLIC BLOOD PRESSURE: 90 MMHG | SYSTOLIC BLOOD PRESSURE: 133 MMHG | TEMPERATURE: 96.8 F | RESPIRATION RATE: 21 BRPM | OXYGEN SATURATION: 100 %

## 2024-08-04 DIAGNOSIS — T40.2X1A OPIOID OVERDOSE, ACCIDENTAL OR UNINTENTIONAL, INITIAL ENCOUNTER (HCC): ICD-10-CM

## 2024-08-04 DIAGNOSIS — R41.82 ALTERED MENTAL STATUS, UNSPECIFIED ALTERED MENTAL STATUS TYPE: Primary | ICD-10-CM

## 2024-08-04 LAB
ALBUMIN SERPL-MCNC: 4 G/DL (ref 3.4–5)
ALBUMIN/GLOB SERPL: 1.2 {RATIO} (ref 1.1–2.2)
ALP SERPL-CCNC: 110 U/L (ref 40–129)
ALT SERPL-CCNC: 95 U/L (ref 10–40)
ANION GAP SERPL CALCULATED.3IONS-SCNC: 14 MMOL/L (ref 3–16)
APAP SERPL-MCNC: <5 UG/ML (ref 10–30)
AST SERPL-CCNC: 83 U/L (ref 15–37)
BASOPHILS # BLD: 0.1 K/UL (ref 0–0.2)
BASOPHILS NFR BLD: 0.9 %
BILIRUB SERPL-MCNC: 0.7 MG/DL (ref 0–1)
BUN SERPL-MCNC: 19 MG/DL (ref 7–20)
CALCIUM SERPL-MCNC: 8.9 MG/DL (ref 8.3–10.6)
CHLORIDE SERPL-SCNC: 101 MMOL/L (ref 99–110)
CO2 SERPL-SCNC: 19 MMOL/L (ref 21–32)
CREAT SERPL-MCNC: 0.9 MG/DL (ref 0.9–1.3)
DEPRECATED RDW RBC AUTO: 14.3 % (ref 12.4–15.4)
EKG DIAGNOSIS: NORMAL
EKG Q-T INTERVAL: 350 MS
EKG QRS DURATION: 90 MS
EKG QTC CALCULATION (BAZETT): 396 MS
EKG R AXIS: 38 DEGREES
EKG T AXIS: 49 DEGREES
EKG VENTRICULAR RATE: 77 BPM
EOSINOPHIL # BLD: 0.1 K/UL (ref 0–0.6)
EOSINOPHIL NFR BLD: 1.5 %
ETHANOLAMINE SERPL-MCNC: NORMAL MG/DL (ref 0–0.08)
GFR SERPLBLD CREATININE-BSD FMLA CKD-EPI: >90 ML/MIN/{1.73_M2}
GLUCOSE SERPL-MCNC: 81 MG/DL (ref 70–99)
HCT VFR BLD AUTO: 49.1 % (ref 40.5–52.5)
HGB BLD-MCNC: 16.1 G/DL (ref 13.5–17.5)
LYMPHOCYTES # BLD: 3.2 K/UL (ref 1–5.1)
LYMPHOCYTES NFR BLD: 37.4 %
MCH RBC QN AUTO: 30.6 PG (ref 26–34)
MCHC RBC AUTO-ENTMCNC: 32.7 G/DL (ref 31–36)
MCV RBC AUTO: 93.5 FL (ref 80–100)
MONOCYTES # BLD: 0.7 K/UL (ref 0–1.3)
MONOCYTES NFR BLD: 7.8 %
NEUTROPHILS # BLD: 4.6 K/UL (ref 1.7–7.7)
NEUTROPHILS NFR BLD: 52.4 %
PLATELET # BLD AUTO: 105 K/UL (ref 135–450)
PMV BLD AUTO: 8.9 FL (ref 5–10.5)
POTASSIUM SERPL-SCNC: 4.2 MMOL/L (ref 3.5–5.1)
POTASSIUM SERPL-SCNC: 5.7 MMOL/L (ref 3.5–5.1)
PROT SERPL-MCNC: 7.4 G/DL (ref 6.4–8.2)
RBC # BLD AUTO: 5.25 M/UL (ref 4.2–5.9)
SALICYLATES SERPL-MCNC: <0.3 MG/DL (ref 15–30)
SODIUM SERPL-SCNC: 134 MMOL/L (ref 136–145)
WBC # BLD AUTO: 8.7 K/UL (ref 4–11)

## 2024-08-04 PROCEDURE — 71045 X-RAY EXAM CHEST 1 VIEW: CPT

## 2024-08-04 PROCEDURE — 93005 ELECTROCARDIOGRAM TRACING: CPT

## 2024-08-04 PROCEDURE — 84132 ASSAY OF SERUM POTASSIUM: CPT

## 2024-08-04 PROCEDURE — 80053 COMPREHEN METABOLIC PANEL: CPT

## 2024-08-04 PROCEDURE — 99285 EMERGENCY DEPT VISIT HI MDM: CPT

## 2024-08-04 PROCEDURE — 85025 COMPLETE CBC W/AUTO DIFF WBC: CPT

## 2024-08-04 PROCEDURE — 80179 DRUG ASSAY SALICYLATE: CPT

## 2024-08-04 PROCEDURE — 80143 DRUG ASSAY ACETAMINOPHEN: CPT

## 2024-08-04 PROCEDURE — 82077 ASSAY SPEC XCP UR&BREATH IA: CPT

## 2024-08-04 PROCEDURE — 36415 COLL VENOUS BLD VENIPUNCTURE: CPT

## 2024-08-04 ASSESSMENT — LIFESTYLE VARIABLES
HOW MANY STANDARD DRINKS CONTAINING ALCOHOL DO YOU HAVE ON A TYPICAL DAY: PATIENT DECLINED
HOW OFTEN DO YOU HAVE A DRINK CONTAINING ALCOHOL: PATIENT DECLINED

## 2024-08-04 NOTE — ED PROVIDER NOTES
ED Attending Attestation Note     Date of evaluation: 8/4/2024    This patient was seen by the resident.  I have seen and examined the patient, agree with the workup, evaluation, management and diagnosis. The care plan has been discussed.  I have reviewed the ECG and concur with the resident's interpretation.  My assessment reveals 53-year-old male with history of polysubstance abuse who presents for altered mental status and possible ingestion.  Patient reportedly locked himself in the bathroom and when police and EMS arrived they found an envelope with white powder.  Patient was given Narcan with appropriate response.  Patient did admit to the resident that he was trying to adjust heroin in the envelope, though after several hours of observation in the emerged from it he denies this to me.  Patient is now awake and alert and tolerating p.o.'s in the emergency ferment.  His laboratory studies were unremarkable except for an initial hyperkalemia that was hemolyzed but normal on recheck.  I do feel the patient is stable for discharge back to his care facility.    Medical Decision Making  Problems Addressed:  Altered mental status, unspecified altered mental status type: acute illness or injury  Opioid overdose, accidental or unintentional, initial encounter (HCC): acute illness or injury    Amount and/or Complexity of Data Reviewed  Labs: ordered. Decision-making details documented in ED Course.  Radiology: ordered. Decision-making details documented in ED Course.  ECG/medicine tests: ordered and independent interpretation performed. Decision-making details documented in ED Course.    Clinical pression:  1.  Altered mental status, unspecified altered mental status type  2.  Opioid overdose, accidental or unintentional, initial encounter     Raul Ratliff MD  08/04/24 0672    
METOPROLOL SUCCINATE (TOPROL XL) 50 MG EXTENDED RELEASE TABLET    Take 1 tablet by mouth daily    OXYCODONE-ACETAMINOPHEN (PERCOCET) 5-325 MG PER TABLET    Take 1 tablet by mouth every 8 hours as needed for Pain for 10 doses.    SACUBITRIL-VALSARTAN (ENTRESTO) 24-26 MG PER TABLET    Take 1 tablet by mouth 2 times daily    SPIRONOLACTONE (ALDACTONE) 25 MG TABLET    Take 1 tablet by mouth daily       Allergies     He has No Known Allergies.    Physical Exam     INITIAL VITALS: BP: (!) 116/104, Temp: 96.8 °F (36 °C), Pulse: (!) 104, Respirations: 17, SpO2: 94 %   Physical Exam  Constitutional:       Comments: Mild agitation present, unable to sit still   HENT:      Head: Normocephalic and atraumatic.      Mouth/Throat:      Pharynx: Oropharynx is clear.   Cardiovascular:      Rate and Rhythm: Normal rate and regular rhythm.      Heart sounds: Normal heart sounds.   Pulmonary:      Effort: Pulmonary effort is normal. No respiratory distress.      Breath sounds: Normal breath sounds.   Abdominal:      General: There is no distension.      Palpations: Abdomen is soft.      Tenderness: There is no abdominal tenderness.   Musculoskeletal:      Cervical back: Normal range of motion and neck supple.   Skin:     General: Skin is warm and dry.      Capillary Refill: Capillary refill takes less than 2 seconds.   Neurological:      Mental Status: He is alert.      GCS: GCS eye subscore is 4. GCS verbal subscore is 5. GCS motor subscore is 6.      Cranial Nerves: No dysarthria or facial asymmetry.      Sensory: No sensory deficit.      Motor: No weakness.   Psychiatric:      Comments: Mild acute agitation present, anxious          Julissa Levy MD  Resident  08/04/24 5173

## 2024-08-04 NOTE — ED NOTES
Patient prepared for and ready to be discharged. Patient discharged at this time in no acute distress after verbalizing understanding of discharge instructions. Patient left after receiving After Visit Summary instructions.      Tommy Patricio RN  08/04/24 4750

## 2024-12-08 ENCOUNTER — APPOINTMENT (OUTPATIENT)
Dept: GENERAL RADIOLOGY | Age: 54
End: 2024-12-08
Payer: MEDICAID

## 2024-12-08 ENCOUNTER — HOSPITAL ENCOUNTER (EMERGENCY)
Age: 54
Discharge: HOME | End: 2024-12-08
Attending: EMERGENCY MEDICINE
Payer: MEDICAID

## 2024-12-08 VITALS
HEART RATE: 82 BPM | WEIGHT: 211 LBS | DIASTOLIC BLOOD PRESSURE: 83 MMHG | TEMPERATURE: 98.2 F | SYSTOLIC BLOOD PRESSURE: 116 MMHG | OXYGEN SATURATION: 98 % | RESPIRATION RATE: 20 BRPM | HEIGHT: 73 IN | BODY MASS INDEX: 27.96 KG/M2

## 2024-12-08 DIAGNOSIS — I48.21 PERMANENT ATRIAL FIBRILLATION (HCC): Primary | ICD-10-CM

## 2024-12-08 LAB
ANION GAP SERPL CALCULATED.3IONS-SCNC: 10 MMOL/L (ref 3–16)
BASE EXCESS BLDV CALC-SCNC: 2 MMOL/L (ref -2–3)
BASOPHILS # BLD: 0 K/UL (ref 0–0.2)
BASOPHILS NFR BLD: 0.8 %
BUN SERPL-MCNC: 16 MG/DL (ref 7–20)
CALCIUM SERPL-MCNC: 8.9 MG/DL (ref 8.3–10.6)
CHLORIDE SERPL-SCNC: 102 MMOL/L (ref 99–110)
CO2 BLDV-SCNC: 29 MMOL/L
CO2 SERPL-SCNC: 25 MMOL/L (ref 21–32)
COHGB MFR BLDV: 3.2 % (ref 0–1.5)
CREAT SERPL-MCNC: 0.8 MG/DL (ref 0.9–1.3)
DEPRECATED RDW RBC AUTO: 13.1 % (ref 12.4–15.4)
DIGOXIN SERPL-MCNC: 0.4 NG/ML (ref 0.8–2)
DO-HGB MFR BLDV: 2.6 %
EOSINOPHIL # BLD: 0.1 K/UL (ref 0–0.6)
EOSINOPHIL NFR BLD: 1 %
GFR SERPLBLD CREATININE-BSD FMLA CKD-EPI: >90 ML/MIN/{1.73_M2}
GLUCOSE SERPL-MCNC: 111 MG/DL (ref 70–99)
HCO3 BLDV-SCNC: 27.7 MMOL/L (ref 24–28)
HCT VFR BLD AUTO: 44.3 % (ref 40.5–52.5)
HGB BLD-MCNC: 15 G/DL (ref 13.5–17.5)
LYMPHOCYTES # BLD: 1.8 K/UL (ref 1–5.1)
LYMPHOCYTES NFR BLD: 33.1 %
MAGNESIUM SERPL-MCNC: 1.68 MG/DL (ref 1.8–2.4)
MCH RBC QN AUTO: 31.2 PG (ref 26–34)
MCHC RBC AUTO-ENTMCNC: 33.9 G/DL (ref 31–36)
MCV RBC AUTO: 92.1 FL (ref 80–100)
METHGB MFR BLDV: 0.2 % (ref 0–1.5)
MONOCYTES # BLD: 0.5 K/UL (ref 0–1.3)
MONOCYTES NFR BLD: 8.8 %
NEUTROPHILS # BLD: 3 K/UL (ref 1.7–7.7)
NEUTROPHILS NFR BLD: 56.3 %
NT-PROBNP SERPL-MCNC: 223 PG/ML (ref 0–124)
PCO2 BLDV: 45.9 MMHG (ref 41–51)
PH BLDV: 7.39 [PH] (ref 7.35–7.45)
PHOSPHATE SERPL-MCNC: 3.1 MG/DL (ref 2.5–4.9)
PLATELET # BLD AUTO: 90 K/UL (ref 135–450)
PMV BLD AUTO: 9.1 FL (ref 5–10.5)
PO2 BLDV: 89.9 MMHG (ref 25–40)
POTASSIUM SERPL-SCNC: 4.2 MMOL/L (ref 3.5–5.1)
RBC # BLD AUTO: 4.81 M/UL (ref 4.2–5.9)
SAO2 % BLDV: 97 %
SODIUM SERPL-SCNC: 137 MMOL/L (ref 136–145)
TROPONIN, HIGH SENSITIVITY: <6 NG/L (ref 0–22)
WBC # BLD AUTO: 5.3 K/UL (ref 4–11)

## 2024-12-08 PROCEDURE — 83735 ASSAY OF MAGNESIUM: CPT

## 2024-12-08 PROCEDURE — 2500000003 HC RX 250 WO HCPCS: Performed by: EMERGENCY MEDICINE

## 2024-12-08 PROCEDURE — 80162 ASSAY OF DIGOXIN TOTAL: CPT

## 2024-12-08 PROCEDURE — 83880 ASSAY OF NATRIURETIC PEPTIDE: CPT

## 2024-12-08 PROCEDURE — 96374 THER/PROPH/DIAG INJ IV PUSH: CPT

## 2024-12-08 PROCEDURE — 71045 X-RAY EXAM CHEST 1 VIEW: CPT

## 2024-12-08 PROCEDURE — 85025 COMPLETE CBC W/AUTO DIFF WBC: CPT

## 2024-12-08 PROCEDURE — 93005 ELECTROCARDIOGRAM TRACING: CPT | Performed by: EMERGENCY MEDICINE

## 2024-12-08 PROCEDURE — 80048 BASIC METABOLIC PNL TOTAL CA: CPT

## 2024-12-08 PROCEDURE — 99285 EMERGENCY DEPT VISIT HI MDM: CPT

## 2024-12-08 PROCEDURE — 84100 ASSAY OF PHOSPHORUS: CPT

## 2024-12-08 PROCEDURE — 82803 BLOOD GASES ANY COMBINATION: CPT

## 2024-12-08 PROCEDURE — 6370000000 HC RX 637 (ALT 250 FOR IP): Performed by: EMERGENCY MEDICINE

## 2024-12-08 PROCEDURE — 84484 ASSAY OF TROPONIN QUANT: CPT

## 2024-12-08 RX ORDER — METOPROLOL TARTRATE 1 MG/ML
5 INJECTION, SOLUTION INTRAVENOUS ONCE
Status: COMPLETED | OUTPATIENT
Start: 2024-12-08 | End: 2024-12-08

## 2024-12-08 RX ORDER — DIGOXIN 125 MCG
250 TABLET ORAL ONCE
Status: COMPLETED | OUTPATIENT
Start: 2024-12-08 | End: 2024-12-08

## 2024-12-08 RX ADMIN — METOPROLOL TARTRATE 5 MG: 1 INJECTION, SOLUTION INTRAVENOUS at 19:40

## 2024-12-08 RX ADMIN — DIGOXIN 250 MCG: 0.12 TABLET ORAL at 21:11

## 2024-12-08 ASSESSMENT — LIFESTYLE VARIABLES
HOW OFTEN DO YOU HAVE A DRINK CONTAINING ALCOHOL: NEVER
HOW MANY STANDARD DRINKS CONTAINING ALCOHOL DO YOU HAVE ON A TYPICAL DAY: PATIENT DOES NOT DRINK

## 2024-12-08 ASSESSMENT — PAIN SCALES - GENERAL: PAINLEVEL_OUTOF10: 8

## 2024-12-08 ASSESSMENT — PAIN DESCRIPTION - LOCATION: LOCATION: HIP

## 2024-12-08 ASSESSMENT — PAIN - FUNCTIONAL ASSESSMENT: PAIN_FUNCTIONAL_ASSESSMENT: 0-10

## 2024-12-09 LAB
EKG ATRIAL RATE: 127 BPM
EKG DIAGNOSIS: NORMAL
EKG Q-T INTERVAL: 348 MS
EKG QRS DURATION: 84 MS
EKG QTC CALCULATION (BAZETT): 455 MS
EKG R AXIS: 30 DEGREES
EKG T AXIS: 17 DEGREES
EKG VENTRICULAR RATE: 103 BPM

## 2024-12-09 NOTE — ED PROVIDER NOTES
following drugs: IV and Cocaine. Frequency: 7.00 times per week.    Medications     Discharge Medication List as of 12/8/2024  9:45 PM        CONTINUE these medications which have NOT CHANGED    Details   aspirin 81 MG EC tablet Take 1 tablet by mouth daily, Disp-30 tablet, R-3Normal      levETIRAcetam (KEPPRA) 500 MG tablet Take 1 tablet by mouth 2 times daily, Disp-60 tablet, R-3Normal      empagliflozin (JARDIANCE) 10 MG tablet Take 1 tablet by mouth daily, Disp-30 tablet, R-3Normal      metoprolol succinate (TOPROL XL) 50 MG extended release tablet Take 1 tablet by mouth daily, Disp-30 tablet, R-3Normal      digoxin (LANOXIN) 125 MCG tablet Take 1 tablet by mouth daily, Disp-30 tablet, R-3Normal      sacubitril-valsartan (ENTRESTO) 24-26 MG per tablet Take 1 tablet by mouth 2 times daily, Disp-60 tablet, R-3Normal      spironolactone (ALDACTONE) 25 MG tablet Take 1 tablet by mouth daily, Disp-30 tablet, R-3Normal      ALPRAZolam (XANAX) 0.5 MG tablet Take 1 tablet by mouth 3 times daily as needed for Anxiety.Historical Med      oxycodone-acetaminophen (PERCOCET) 5-325 MG per tablet Take 1 tablet by mouth every 8 hours as needed for Pain for 10 doses., Disp-10 tablet, R-0             Allergies     He is allergic to latex, quetiapine, nsaids, and penicillins.    Physical Exam     INITIAL VITALS: BP: 123/89, Temp: 98.2 °F (36.8 °C), Pulse: (!) 103, Respirations: 18, SpO2: 96 %   Physical Exam  Vitals and nursing note reviewed.   Constitutional:       General: He is not in acute distress.  HENT:      Head: Normocephalic and atraumatic.      Mouth/Throat:      Mouth: Mucous membranes are moist.      Pharynx: No oropharyngeal exudate.   Eyes:      General: No scleral icterus.     Extraocular Movements: Extraocular movements intact.      Conjunctiva/sclera: Conjunctivae normal.      Pupils: Pupils are equal, round, and reactive to light.   Cardiovascular:      Rate and Rhythm: Tachycardia present. Rhythm irregular.

## 2024-12-09 NOTE — ED NOTES
Reviewed discharge instructions, medication reconciliation, and patient education information with patient. Patient stated understanding, and answered questions to satisfaction. Patient verbalized satisfaction of care. PIV removed at this time. Patient ambulated safely to exit with a steady gait in no obvious acute distress. Patient verbalized understanding of returning to ER if symptoms worsen, and to follow up with primary health care provider.        Elle Washburn, RN  12/08/24 5263

## 2024-12-09 NOTE — DISCHARGE INSTRUCTIONS
Your symptoms of shortness of breath and shakiness are most likely due to atrial fibrillation.  Your digoxin level was undetectable so you were given a dose of digoxin in the emergency department.  It is important that you receive this medication every day to help control your atrial fibrillation.  Also in review of your prior records, you were seen by neurology in October and your dosing of Keppra is supposed to be 500 mg twice a day while on your paperwork from the facility it is being administered 3 times a day.    Please make sure you are receiving 125 mcg of digoxin daily and 500 mg of Keppra twice a day instead of every 8 hours.

## 2025-02-15 ENCOUNTER — APPOINTMENT (OUTPATIENT)
Dept: CT IMAGING | Age: 55
DRG: 861 | End: 2025-02-15
Payer: MEDICAID

## 2025-02-15 ENCOUNTER — HOSPITAL ENCOUNTER (INPATIENT)
Age: 55
LOS: 3 days | Discharge: HOME HEALTH | DRG: 861 | End: 2025-02-19
Attending: STUDENT IN AN ORGANIZED HEALTH CARE EDUCATION/TRAINING PROGRAM | Admitting: INTERNAL MEDICINE
Payer: MEDICAID

## 2025-02-15 ENCOUNTER — APPOINTMENT (OUTPATIENT)
Dept: GENERAL RADIOLOGY | Age: 55
DRG: 861 | End: 2025-02-15
Payer: MEDICAID

## 2025-02-15 DIAGNOSIS — R47.1 DYSARTHRIA: ICD-10-CM

## 2025-02-15 DIAGNOSIS — I48.20 CHRONIC A-FIB (HCC): ICD-10-CM

## 2025-02-15 DIAGNOSIS — F19.10 POLYSUBSTANCE ABUSE (HCC): ICD-10-CM

## 2025-02-15 DIAGNOSIS — R53.1 GENERAL WEAKNESS: Primary | ICD-10-CM

## 2025-02-15 DIAGNOSIS — F41.9 ANXIETY: ICD-10-CM

## 2025-02-15 LAB
ALBUMIN SERPL-MCNC: 2.8 G/DL (ref 3.4–5)
ALBUMIN/GLOB SERPL: 0.7 {RATIO} (ref 1.1–2.2)
ALP SERPL-CCNC: 98 U/L (ref 40–129)
ALT SERPL-CCNC: 136 U/L (ref 10–40)
ANION GAP SERPL CALCULATED.3IONS-SCNC: 8 MMOL/L (ref 3–16)
AST SERPL-CCNC: 140 U/L (ref 15–37)
BASOPHILS # BLD: 0 K/UL (ref 0–0.2)
BASOPHILS NFR BLD: 1.1 %
BILIRUB SERPL-MCNC: 0.6 MG/DL (ref 0–1)
BILIRUB UR QL STRIP.AUTO: NEGATIVE
BUN SERPL-MCNC: 9 MG/DL (ref 7–20)
CALCIUM SERPL-MCNC: 9.5 MG/DL (ref 8.3–10.6)
CHLORIDE SERPL-SCNC: 105 MMOL/L (ref 99–110)
CLARITY UR: CLEAR
CO2 SERPL-SCNC: 27 MMOL/L (ref 21–32)
COLOR UR: YELLOW
CREAT SERPL-MCNC: 0.7 MG/DL (ref 0.9–1.3)
DEPRECATED RDW RBC AUTO: 14.1 % (ref 12.4–15.4)
EOSINOPHIL # BLD: 0 K/UL (ref 0–0.6)
EOSINOPHIL NFR BLD: 1.1 %
GFR SERPLBLD CREATININE-BSD FMLA CKD-EPI: >90 ML/MIN/{1.73_M2}
GLUCOSE BLD-MCNC: 86 MG/DL (ref 70–99)
GLUCOSE SERPL-MCNC: 82 MG/DL (ref 70–99)
GLUCOSE UR STRIP.AUTO-MCNC: NEGATIVE MG/DL
HCT VFR BLD AUTO: 42.9 % (ref 40.5–52.5)
HGB BLD-MCNC: 14.7 G/DL (ref 13.5–17.5)
HGB UR QL STRIP.AUTO: NEGATIVE
INR PPP: 1.24 (ref 0.85–1.15)
KETONES UR STRIP.AUTO-MCNC: NEGATIVE MG/DL
LEUKOCYTE ESTERASE UR QL STRIP.AUTO: NEGATIVE
LYMPHOCYTES # BLD: 1.4 K/UL (ref 1–5.1)
LYMPHOCYTES NFR BLD: 32.7 %
MCH RBC QN AUTO: 31.3 PG (ref 26–34)
MCHC RBC AUTO-ENTMCNC: 34.2 G/DL (ref 31–36)
MCV RBC AUTO: 91.6 FL (ref 80–100)
MONOCYTES # BLD: 0.4 K/UL (ref 0–1.3)
MONOCYTES NFR BLD: 9.8 %
NEUTROPHILS # BLD: 2.4 K/UL (ref 1.7–7.7)
NEUTROPHILS NFR BLD: 55.3 %
NITRITE UR QL STRIP.AUTO: NEGATIVE
PERFORMED ON: NORMAL
PH UR STRIP.AUTO: 7 [PH] (ref 5–8)
PLATELET # BLD AUTO: 118 K/UL (ref 135–450)
PMV BLD AUTO: 8 FL (ref 5–10.5)
POTASSIUM SERPL-SCNC: 4.4 MMOL/L (ref 3.5–5.1)
PROT SERPL-MCNC: 6.9 G/DL (ref 6.4–8.2)
PROT UR STRIP.AUTO-MCNC: NEGATIVE MG/DL
PROTHROMBIN TIME: 15.8 SEC (ref 11.9–14.9)
RBC # BLD AUTO: 4.69 M/UL (ref 4.2–5.9)
SODIUM SERPL-SCNC: 140 MMOL/L (ref 136–145)
SP GR UR STRIP.AUTO: <=1.005 (ref 1–1.03)
TROPONIN, HIGH SENSITIVITY: 20 NG/L (ref 0–22)
TROPONIN, HIGH SENSITIVITY: 23 NG/L (ref 0–22)
UA COMPLETE W REFLEX CULTURE PNL UR: NORMAL
UA DIPSTICK W REFLEX MICRO PNL UR: NORMAL
URN SPEC COLLECT METH UR: NORMAL
UROBILINOGEN UR STRIP-ACNC: 1 E.U./DL
WBC # BLD AUTO: 4.3 K/UL (ref 4–11)

## 2025-02-15 PROCEDURE — 99285 EMERGENCY DEPT VISIT HI MDM: CPT

## 2025-02-15 PROCEDURE — 93005 ELECTROCARDIOGRAM TRACING: CPT | Performed by: STUDENT IN AN ORGANIZED HEALTH CARE EDUCATION/TRAINING PROGRAM

## 2025-02-15 PROCEDURE — 84484 ASSAY OF TROPONIN QUANT: CPT

## 2025-02-15 PROCEDURE — 80053 COMPREHEN METABOLIC PANEL: CPT

## 2025-02-15 PROCEDURE — 85610 PROTHROMBIN TIME: CPT

## 2025-02-15 PROCEDURE — 80307 DRUG TEST PRSMV CHEM ANLYZR: CPT

## 2025-02-15 PROCEDURE — 70450 CT HEAD/BRAIN W/O DYE: CPT

## 2025-02-15 PROCEDURE — 70496 CT ANGIOGRAPHY HEAD: CPT

## 2025-02-15 PROCEDURE — 71046 X-RAY EXAM CHEST 2 VIEWS: CPT

## 2025-02-15 PROCEDURE — 81003 URINALYSIS AUTO W/O SCOPE: CPT

## 2025-02-15 PROCEDURE — 85025 COMPLETE CBC W/AUTO DIFF WBC: CPT

## 2025-02-15 PROCEDURE — 6360000004 HC RX CONTRAST MEDICATION

## 2025-02-15 RX ORDER — METOPROLOL TARTRATE 50 MG
50 TABLET ORAL 2 TIMES DAILY
Status: DISCONTINUED | OUTPATIENT
Start: 2025-02-15 | End: 2025-02-16

## 2025-02-15 RX ORDER — IOPAMIDOL 755 MG/ML
75 INJECTION, SOLUTION INTRAVASCULAR
Status: COMPLETED | OUTPATIENT
Start: 2025-02-15 | End: 2025-02-15

## 2025-02-15 RX ORDER — OXYCODONE AND ACETAMINOPHEN 5; 325 MG/1; MG/1
1 TABLET ORAL ONCE
Status: COMPLETED | OUTPATIENT
Start: 2025-02-15 | End: 2025-02-16

## 2025-02-15 RX ADMIN — IOPAMIDOL 75 ML: 755 INJECTION, SOLUTION INTRAVENOUS at 22:18

## 2025-02-15 ASSESSMENT — PAIN - FUNCTIONAL ASSESSMENT: PAIN_FUNCTIONAL_ASSESSMENT: 0-10

## 2025-02-15 ASSESSMENT — PAIN SCALES - GENERAL: PAINLEVEL_OUTOF10: 10

## 2025-02-16 PROBLEM — R47.1 DYSARTHRIA: Status: ACTIVE | Noted: 2025-02-16

## 2025-02-16 PROBLEM — I48.91 ATRIAL FIBRILLATION WITH RVR (HCC): Status: ACTIVE | Noted: 2025-02-16

## 2025-02-16 PROBLEM — R53.1 LEFT-SIDED WEAKNESS: Status: ACTIVE | Noted: 2025-02-16

## 2025-02-16 LAB
AMMONIA PLAS-SCNC: 74 UMOL/L (ref 16–60)
AMPHETAMINES UR QL SCN>1000 NG/ML: NORMAL
BARBITURATES UR QL SCN>200 NG/ML: NORMAL
BENZODIAZ UR QL SCN>200 NG/ML: NORMAL
CANNABINOIDS UR QL SCN>50 NG/ML: NORMAL
COCAINE UR QL SCN: NORMAL
DIGOXIN SERPL-MCNC: 0.4 NG/ML (ref 0.8–2)
DRUG SCREEN COMMENT UR-IMP: NORMAL
EKG DIAGNOSIS: NORMAL
EKG Q-T INTERVAL: 322 MS
EKG QRS DURATION: 80 MS
EKG QTC CALCULATION (BAZETT): 479 MS
EKG R AXIS: 18 DEGREES
EKG T AXIS: 39 DEGREES
EKG VENTRICULAR RATE: 133 BPM
FENTANYL SCREEN, URINE: NORMAL
LEVETIRACETAM SERPL-MCNC: 10.7 UG/ML (ref 6–46)
MEDICATION DOSE-MCNC: NORMAL
METHADONE UR QL SCN>300 NG/ML: NORMAL
OPIATES UR QL SCN>300 NG/ML: NORMAL
OXYCODONE UR QL SCN: NORMAL
PCP UR QL SCN>25 NG/ML: NORMAL
PH UR STRIP: 7 [PH]

## 2025-02-16 PROCEDURE — 6360000002 HC RX W HCPCS: Performed by: NURSE PRACTITIONER

## 2025-02-16 PROCEDURE — 97161 PT EVAL LOW COMPLEX 20 MIN: CPT

## 2025-02-16 PROCEDURE — 82140 ASSAY OF AMMONIA: CPT

## 2025-02-16 PROCEDURE — 92610 EVALUATE SWALLOWING FUNCTION: CPT

## 2025-02-16 PROCEDURE — 92526 ORAL FUNCTION THERAPY: CPT

## 2025-02-16 PROCEDURE — 36415 COLL VENOUS BLD VENIPUNCTURE: CPT

## 2025-02-16 PROCEDURE — 2500000003 HC RX 250 WO HCPCS: Performed by: NURSE PRACTITIONER

## 2025-02-16 PROCEDURE — 92523 SPEECH SOUND LANG COMPREHEN: CPT

## 2025-02-16 PROCEDURE — 6370000000 HC RX 637 (ALT 250 FOR IP): Performed by: STUDENT IN AN ORGANIZED HEALTH CARE EDUCATION/TRAINING PROGRAM

## 2025-02-16 PROCEDURE — 6370000000 HC RX 637 (ALT 250 FOR IP)

## 2025-02-16 PROCEDURE — 6370000000 HC RX 637 (ALT 250 FOR IP): Performed by: NURSE PRACTITIONER

## 2025-02-16 PROCEDURE — 2060000000 HC ICU INTERMEDIATE R&B

## 2025-02-16 PROCEDURE — 80177 DRUG SCRN QUAN LEVETIRACETAM: CPT

## 2025-02-16 PROCEDURE — 93010 ELECTROCARDIOGRAM REPORT: CPT | Performed by: STUDENT IN AN ORGANIZED HEALTH CARE EDUCATION/TRAINING PROGRAM

## 2025-02-16 PROCEDURE — 97530 THERAPEUTIC ACTIVITIES: CPT

## 2025-02-16 PROCEDURE — 80162 ASSAY OF DIGOXIN TOTAL: CPT

## 2025-02-16 RX ORDER — SACUBITRIL AND VALSARTAN 24; 26 MG/1; MG/1
1 TABLET, FILM COATED ORAL 2 TIMES DAILY
Status: DISCONTINUED | OUTPATIENT
Start: 2025-02-16 | End: 2025-02-18

## 2025-02-16 RX ORDER — LEVETIRACETAM 500 MG/1
500 TABLET ORAL 2 TIMES DAILY
Status: DISCONTINUED | OUTPATIENT
Start: 2025-02-16 | End: 2025-02-19 | Stop reason: HOSPADM

## 2025-02-16 RX ORDER — ALPRAZOLAM 0.5 MG
0.5 TABLET ORAL 3 TIMES DAILY PRN
Status: DISCONTINUED | OUTPATIENT
Start: 2025-02-16 | End: 2025-02-19

## 2025-02-16 RX ORDER — SODIUM CHLORIDE 0.9 % (FLUSH) 0.9 %
5-40 SYRINGE (ML) INJECTION EVERY 12 HOURS SCHEDULED
Status: DISCONTINUED | OUTPATIENT
Start: 2025-02-16 | End: 2025-02-19 | Stop reason: HOSPADM

## 2025-02-16 RX ORDER — OXYCODONE AND ACETAMINOPHEN 5; 325 MG/1; MG/1
1 TABLET ORAL EVERY 8 HOURS PRN
Status: DISCONTINUED | OUTPATIENT
Start: 2025-02-16 | End: 2025-02-19

## 2025-02-16 RX ORDER — SODIUM CHLORIDE 9 MG/ML
INJECTION, SOLUTION INTRAVENOUS PRN
Status: DISCONTINUED | OUTPATIENT
Start: 2025-02-16 | End: 2025-02-19 | Stop reason: HOSPADM

## 2025-02-16 RX ORDER — DIGOXIN 125 MCG
125 TABLET ORAL DAILY
Status: DISCONTINUED | OUTPATIENT
Start: 2025-02-16 | End: 2025-02-19 | Stop reason: HOSPADM

## 2025-02-16 RX ORDER — METOPROLOL SUCCINATE 50 MG/1
50 TABLET, EXTENDED RELEASE ORAL DAILY
Status: DISCONTINUED | OUTPATIENT
Start: 2025-02-16 | End: 2025-02-19 | Stop reason: HOSPADM

## 2025-02-16 RX ORDER — ONDANSETRON 4 MG/1
4 TABLET, ORALLY DISINTEGRATING ORAL EVERY 8 HOURS PRN
Status: DISCONTINUED | OUTPATIENT
Start: 2025-02-16 | End: 2025-02-19 | Stop reason: HOSPADM

## 2025-02-16 RX ORDER — ONDANSETRON 2 MG/ML
4 INJECTION INTRAMUSCULAR; INTRAVENOUS EVERY 6 HOURS PRN
Status: DISCONTINUED | OUTPATIENT
Start: 2025-02-16 | End: 2025-02-19 | Stop reason: HOSPADM

## 2025-02-16 RX ORDER — SPIRONOLACTONE 25 MG/1
25 TABLET ORAL DAILY
Status: DISCONTINUED | OUTPATIENT
Start: 2025-02-16 | End: 2025-02-18

## 2025-02-16 RX ORDER — ASPIRIN 81 MG/1
81 TABLET ORAL DAILY
Status: DISCONTINUED | OUTPATIENT
Start: 2025-02-16 | End: 2025-02-19 | Stop reason: HOSPADM

## 2025-02-16 RX ORDER — ENOXAPARIN SODIUM 100 MG/ML
40 INJECTION SUBCUTANEOUS DAILY
Status: DISCONTINUED | OUTPATIENT
Start: 2025-02-16 | End: 2025-02-17

## 2025-02-16 RX ORDER — POLYETHYLENE GLYCOL 3350 17 G/17G
17 POWDER, FOR SOLUTION ORAL DAILY PRN
Status: DISCONTINUED | OUTPATIENT
Start: 2025-02-16 | End: 2025-02-19 | Stop reason: HOSPADM

## 2025-02-16 RX ORDER — SODIUM CHLORIDE 0.9 % (FLUSH) 0.9 %
5-40 SYRINGE (ML) INJECTION PRN
Status: DISCONTINUED | OUTPATIENT
Start: 2025-02-16 | End: 2025-02-19 | Stop reason: HOSPADM

## 2025-02-16 RX ADMIN — OXYCODONE HYDROCHLORIDE AND ACETAMINOPHEN 1 TABLET: 5; 325 TABLET ORAL at 06:05

## 2025-02-16 RX ADMIN — OXYCODONE HYDROCHLORIDE AND ACETAMINOPHEN 1 TABLET: 5; 325 TABLET ORAL at 14:24

## 2025-02-16 RX ADMIN — ASPIRIN 81 MG: 81 TABLET, COATED ORAL at 07:43

## 2025-02-16 RX ADMIN — SACUBITRIL AND VALSARTAN 1 TABLET: 24; 26 TABLET, FILM COATED ORAL at 07:43

## 2025-02-16 RX ADMIN — ALPRAZOLAM 0.5 MG: 0.5 TABLET ORAL at 14:26

## 2025-02-16 RX ADMIN — SACUBITRIL AND VALSARTAN 1 TABLET: 24; 26 TABLET, FILM COATED ORAL at 02:37

## 2025-02-16 RX ADMIN — OXYCODONE HYDROCHLORIDE AND ACETAMINOPHEN 1 TABLET: 5; 325 TABLET ORAL at 00:24

## 2025-02-16 RX ADMIN — ENOXAPARIN SODIUM 40 MG: 100 INJECTION SUBCUTANEOUS at 07:43

## 2025-02-16 RX ADMIN — SPIRONOLACTONE 25 MG: 25 TABLET ORAL at 07:43

## 2025-02-16 RX ADMIN — OXYCODONE HYDROCHLORIDE AND ACETAMINOPHEN 1 TABLET: 5; 325 TABLET ORAL at 22:51

## 2025-02-16 RX ADMIN — METOPROLOL SUCCINATE 50 MG: 50 TABLET, FILM COATED, EXTENDED RELEASE ORAL at 07:43

## 2025-02-16 RX ADMIN — ALPRAZOLAM 0.5 MG: 0.5 TABLET ORAL at 02:37

## 2025-02-16 RX ADMIN — METOPROLOL TARTRATE 50 MG: 50 TABLET, FILM COATED ORAL at 00:24

## 2025-02-16 RX ADMIN — LEVETIRACETAM 500 MG: 500 TABLET, FILM COATED ORAL at 21:40

## 2025-02-16 RX ADMIN — ALPRAZOLAM 0.5 MG: 0.5 TABLET ORAL at 07:43

## 2025-02-16 RX ADMIN — LEVETIRACETAM 500 MG: 500 TABLET, FILM COATED ORAL at 07:43

## 2025-02-16 RX ADMIN — DIGOXIN 125 MCG: 125 TABLET ORAL at 07:43

## 2025-02-16 RX ADMIN — SODIUM CHLORIDE, PRESERVATIVE FREE 10 ML: 5 INJECTION INTRAVENOUS at 07:44

## 2025-02-16 RX ADMIN — SODIUM CHLORIDE, PRESERVATIVE FREE 10 ML: 5 INJECTION INTRAVENOUS at 21:40

## 2025-02-16 RX ADMIN — ALPRAZOLAM 0.5 MG: 0.5 TABLET ORAL at 21:40

## 2025-02-16 ASSESSMENT — PAIN DESCRIPTION - DESCRIPTORS
DESCRIPTORS: ACHING;DISCOMFORT
DESCRIPTORS: ACHING
DESCRIPTORS: ACHING;DISCOMFORT
DESCRIPTORS: ACHING

## 2025-02-16 ASSESSMENT — PAIN SCALES - GENERAL
PAINLEVEL_OUTOF10: 10
PAINLEVEL_OUTOF10: 4
PAINLEVEL_OUTOF10: 8
PAINLEVEL_OUTOF10: 0
PAINLEVEL_OUTOF10: 8

## 2025-02-16 ASSESSMENT — PAIN - FUNCTIONAL ASSESSMENT: PAIN_FUNCTIONAL_ASSESSMENT: PREVENTS OR INTERFERES SOME ACTIVE ACTIVITIES AND ADLS

## 2025-02-16 ASSESSMENT — PAIN DESCRIPTION - ORIENTATION
ORIENTATION: LEFT

## 2025-02-16 ASSESSMENT — PAIN DESCRIPTION - ONSET
ONSET: ON-GOING
ONSET: ON-GOING

## 2025-02-16 ASSESSMENT — PAIN DESCRIPTION - LOCATION
LOCATION: LEG

## 2025-02-16 ASSESSMENT — PAIN DESCRIPTION - FREQUENCY
FREQUENCY: CONTINUOUS
FREQUENCY: CONTINUOUS

## 2025-02-16 ASSESSMENT — PAIN DESCRIPTION - PAIN TYPE
TYPE: CHRONIC PAIN
TYPE: CHRONIC PAIN

## 2025-02-16 NOTE — ED PROVIDER NOTES
[facility]  EMERGENCY DEPARTMENT ENCOUNTER        Pt Name: Lazaro Resendiz  MRN: [id number]  Birthdate 1970  Date of evaluation: 2/15/2025  Provider: MISSY Urena - JUANA  PCP: Daniel Amador DO  Note Started: 11:11 PM EST 2/15/25       I have seen and evaluated this patient with my supervising physician Eduardo Bunch DO.      CHIEF COMPLAINT       Chief Complaint   Patient presents with    Extremity Weakness     Pt came in from home, pt reports left sided weakness that started yesterday that he notices when he walks,       HISTORY OF PRESENT ILLNESS: 1 or more Elements     History From: Patient, EMR review    Chief Complaint: Slurred speech, left-sided weakness    Lazaro Resendiz is a 54 y.o. male with a past medical history notable for atrial fibrillation on Xarelto, CAD, heart failure with reduced ejection fraction, seizure disorder, hypertension, medical noncompliance, polysubstance abuse disorder who presents to the emergency department for evaluation of slurred speech and left-sided weakness that he states started sometime yesterday afternoon on February 14.  Patient does use a wheelchair at home after remote history of a femur fracture.  States that he is not able to take steps independently without holding onto furniture.  This is new as of about noon yesterday.  He also noted that his speech has been somewhat slurred.  Does take Xarelto.  Denies any falls.  No headache.  No chest pain or shortness of breath.    Nursing Notes were all reviewed and agreed with or any disagreements were addressed in the HPI.    REVIEW OF SYSTEMS :      Review of Systems   Constitutional:  Negative for chills, diaphoresis, fatigue and fever.   Respiratory:  Negative for chest tightness and shortness of breath.    Cardiovascular:  Negative for chest pain and palpitations.   Gastrointestinal:  Negative for abdominal pain, nausea and vomiting.   Musculoskeletal:  Negative for myalgias.

## 2025-02-16 NOTE — ED NOTES
Patient resting in bed, VSS on RA. Patient alert and oriented, no questions/concerns at this time.       Patient states he was recently at [facility] but he left. He wants to return to [facility]

## 2025-02-16 NOTE — PROGRESS NOTES
[facility] - Inpatient Rehabilitation Department   Phone: [phone]    Physical Therapy    [x] Initial Evaluation            [] Daily Treatment Note         [] Discharge Summary      Patient: Lazaro Resendiz   : 1970   MRN: [id number]   Date of Service:  2025  Admitting Diagnosis: Dysarthria  Current Admission Summary: Left sided weakness and dysarthria  Lazaro Resendiz is a 54 y.o. male with pmh of atrial fibrillation, hypertension, history of drug abuse, anxiety who presents with worsening left-sided weakness and dysarthria.  Initially the concern was that patient started having dysarthria and left-sided weakness 2 days ago however with further questioning he states symptoms have been going on for over a year but has become progressively worse.  He normally ambulates with a walker but has been having to use his wheelchair because of weakness on left side.  He attributes symptoms to his Keppra.  He denies any history of seizures and has been compliant with Keppra.  After reviewing his medical chart apparently he had 1 episode of seizure after extubation at .  He has a history of IV drug use but he last used Aug. 2024.    He follows with neurology outpatient and had an EEG and MRI to evaluated symptoms.   He is concerned he can no longer take care of himself so he came to ER.   Past Medical History:  has a past medical history of Atrial fibrillation ([facility]), Back pain, CHF (congestive heart failure) ([facility]), OCD (obsessive compulsive disorder), and Seizures ([facility]).  Past Surgical History:  has a past surgical history that includes Knee arthroscopy (Bilateral) and Total shoulder arthroplasty (Left).    Discharge Recommendations: Lazaro Resendiz scored a 17/24 on the AM-PAC short mobility form. Current research shows that an AM-PAC score of 17 or less is typically not associated with a discharge to the patient's home setting. Based on the patient's AM-PAC score and their current functional mobility deficits, it

## 2025-02-16 NOTE — PROGRESS NOTES
Speech Language Pathology  [facility] - Inpatient Rehabilitation Services  [phone]  SLP Clinical Swallow Evaluation and Speech Language Cognitive Assessment       Patient: Lazaro Resendiz   : 1970   MRN: [id number]      Evaluation Date: 2025      Admitting Dx: Dysarthria [R47.1]  Treatment Diagnosis:  Dysarthria , Oropharyngeal Dysphagia   Pain: Denies                                  Recommendations      Recommended Diet and Intervention 2025:  Diet Solids Recommendation:  Regular texture diet  Liquid Consistency Recommendation:  Thin liquids  Recommended form of Meds:   Meds whole with water     Compensatory strategies: Alternate solids/liquids , Upright as possible with all PO intake , Eat/feed slowly    Discharge Recommendations:  Do not anticipate need for further speech/dysphagia therapy upon discharge from hospital     History/Course of Treatment     H&P:   \"Lazaro Resendiz is a 54 y.o. male with pmh of atrial fibrillation, hypertension, history of drug abuse, anxiety who presents with worsening left-sided weakness and dysarthria.  Initially the concern was that patient started having dysarthria and left-sided weakness 2 days ago however with further questioning he states symptoms have been going on for over a year but has become progressively worse.  He normally ambulates with a walker but has been having to use his wheelchair because of weakness on left side.  He attributes symptoms to his Keppra.  He denies any history of seizures and has been compliant with Keppra.  After reviewing his medical chart apparently he had 1 episode of seizure after extubation at .  He has a history of IV drug use but he last used Aug. 2024.    He follows with neurology outpatient and had an EEG and MRI to evaluated symptoms.   He is concerned he can no longer take care of himself so he came to ER.\"    Imaging:  Chest X-ray:   2/15/25  IMPRESSION:  No acute process.    Head CT:   2/15/25  IMPRESSION:  No  acute intracranial abnormality.  CTA OF THE NECK:  No dissection, or hemodynamically significant stenosis by NASCET criteria.  CTA OF THE HEAD:  No significant stenosis of the intracranial arterial vessels.    Prior Modified Barium Swallow Study:  6/12/23  \"Pt seen this date for VFSS to gain objective view of pharyngeal phase of swallow and r/o aspiration. VFSS revealed mild oropharyngeal phase dysphagia. Esophageal phase of swallow was not assessed during this study. Each phase of swallow is briefly described in paragraphs below. For further information regarding each phase of swallow, please refer to corresponding tables above.   Oral Phase: Pt demonstrates mild oral phase dysphagia chacterized by prolonged mastication, lingual pumping, and lingual surface/BOT residue post swallow.  Pharyngeal Phase: Pt demonstrates mild pharyngeal phase dysphagia as characterized by delayed swallow onset, decreased tongue base retraction and hyolaryngeal elevation resulting in vallecular and pyriform residue, and decreased pharyngeal wall contraction. Penetration of thin liquids resulting in silent, trace tracheal aspiration. Shallow depth penetration  noted with nectar-thick liquids that clears with completion of the swallow.   Recommend diet of regular/nectar-thick liquids -  straws ok. Medications as tolerated. Recommend initiation of [place] [employer] [place] protocol. Would recommend continued dysphagia tx during IP stay to address these deficits. RN notified. SLP team to continue POC.\"    6/22/23  \"VFSS was repeated on 6/22 demonstrating mild pharyngeal dysphagia characterized by mild delay in swallow onset with diminished laryngeal elevation. Vellecular and pyriform residue in all consistencies, but pt independently initiates second swallow to clear. Very trace penetration of thin liquids 1x during swallow study with minimal residue. No aspiration. Recommended thin/regular with straws ok. Medication whole with thin or puree.

## 2025-02-16 NOTE — H&P
V2.0  History and Physical      Name:  Lazaro Resendiz /Age/Sex: 1970  (54 y.o. male)   MRN & CSN:  [id number] & [id number] Encounter Date/Time: 2025 1:31 AM EST   Location:  [facility] PCP: Daniel Amador DO       Hospital Day: 2    Assessment and Plan:   Lazaro Resendiz is a 54 y.o. male  who presents with Dysarthria    Hospital Problems             Last Modified POA    * (Principal) Dysarthria 2025 Yes       Plan:  Dysarthria with Left Sided Weakness  Admit inpatient with telemetry  Initially symptoms were thought to have started 2 days ago however with further questioning he has been having the symptoms for over a year but they have become progressively worse.  MRI brain in am  Neuro checks  Neurology consult  Speech eval  Check ammonia level  Continue ASA  UDS  Lipid panel, Hgb A1 C  NIHSS 2  PT, OT   Case management for discharge planning     2. Seizures  Continue Keppra for now  Patient denies any seizures but there was a note of seizure after extubation at     3. Hypertension  BP stable continue home meds    4. Atrial Fibrillation with RVR  Given metoprolol in ER and HR improved  Only on ASA  [id number] Score for Atrial Fibrillation Stroke Risk   Risk   Factors  Component Value   C CHF No 0   H HTN Yes 1   A2 Age >= 75 No,  (54 y.o.) 0   D DM No 0   S2 Prior Stroke/TIA No 0   V Vascular Disease No 0   A Age 65-74 No,  (54 y.o.) 0   Sc Sex male 0    [id number]  Score  1   Score last updated 25 1:50 AM EST    Click here for a link to the UpToDate guideline \"Atrial Fibrillation: Anticoagulation therapy to prevent embolization    Disclaimer: Risk Score calculation is dependent on accuracy of patient problem list and past encounter diagnosis.     Disposition:   Current Living situation: home  Expected Disposition: home  Estimated D/C: 3    Diet Diet NPO   DVT Prophylaxis [x] Lovenox, []  Heparin, [] SCDs, [] Ambulation,  [] Eliquis, [] Xarelto, [] Coumadin   Code Status Prior   Surrogate  cerebral, or middle cerebral arteries. No aneurysm. POSTERIOR CIRCULATION: No significant stenosis of the basilar or posterior cerebral arteries. No aneurysm. OTHER: No dural venous sinus thrombosis on this non-dedicated study. BRAIN: No mass effect or midline shift. No extra-axial fluid collection. The gray-white differentiation is maintained.     No acute intracranial abnormality. CTA OF THE NECK: No dissection, or hemodynamically significant stenosis by NASCET criteria. CTA OF THE HEAD: No significant stenosis of the intracranial arterial vessels.     XR CHEST (2 VW)    Result Date: 2/15/2025  EXAMINATION: TWO XRAY VIEWS OF THE CHEST 2/15/2025 9:19 pm COMPARISON: 12/08/2024 HISTORY: ORDERING SYSTEM PROVIDED HISTORY: weakness TECHNOLOGIST PROVIDED HISTORY: Reason for exam:->weakness FINDINGS: The lungs are without acute focal process.  There is no effusion or pneumothorax. The cardiomediastinal silhouette is without acute process. The osseous structures are without acute process.     No acute process.         Electronically signed by MISSY Hurst CNP on 2/16/2025 at 1:31 AM

## 2025-02-16 NOTE — ED PROVIDER NOTES
EMERGENCY DEPARTMENT PROVIDER NOTE         PATIENT IDENTIFICATION     Name:   Lazaro Resendiz  MRN:   [id number]  YOB: 1970  Date of Evaluation:   2/15/2025  Provider:   Erica James NP; Eduardo Bunch DO  PCP:   Daniel Amador DO        CHIEF COMPLAINT       Extremity Weakness (Pt came in from home, pt reports left sided weakness that started yesterday that he notices when he walks,)        HISTORY OF PRESENT ILLNESS     I independently interviewed patient and/or caretaker(s).  See Advanced Practice Provider (VALENTINA) note for full HPI.  In summary, Lazaro Resendiz  is a(n) 54 y.o. male who presents with slurred speech and left-sided weakness that started at some point yesterday.  Is wheelchair-bound at baseline, but states that he feels somewhat more unstable than usual.        PHYSICAL EXAM     I reviewed physical exam performed and documented by VALENTINA.  I performed an independent physical examination with findings as follows:  Somewhat ill-appearing male with mild dysarthria and some ataxia        EKG INTERPRETATION     TIME:     RATE:   133 bpm  AK INTERVAL:   * ms  QRS DURATION:   80 ms  QT/QTc:   322/479 ms  RHYTHM:   Atrial fibrillation  AXIS:   Regular  ABNORMALITIES  No STEMI  No ischemic changes    PRIOR EK/8/24- current EKG without significant changes when compared to prior    INTERPRETATION:  Atrial fibrillation with RVR, unchanged from prior    REVIEWED BY:   Eduardo Bunch Jr., DO    EKG was independently reviewed by emergency department physician in absence of cardiologist.        LAB RESULTS     Results for orders placed or performed during the hospital encounter of 02/15/25   CBC with Auto Differential   Result Value Ref Range    WBC 4.3 4.0 - 11.0 K/uL    RBC 4.69 4.20 - 5.90 M/uL    Hemoglobin 14.7 13.5 - 17.5 g/dL    Hematocrit 42.9 40.5 - 52.5 %    MCV 91.6 80.0 - 100.0 fL    MCH 31.3 26.0 - 34.0 pg    MCHC 34.2 31.0 - 36.0 g/dL    RDW 14.1 12.4 - 15.4 %     admitted to hospital    I independently interpreted EKG (see full interpretation above), lab work (per ED course), and imaging (per ED course).     The following risk stratification rule(s) were utilized in medical decision making:  -   NIHSS:   2  Please see detailed scoring above.    ED Course as of 02/16/25 0536   Sat Feb 15, 2025   2309 WBC: 4.3 [PB]   2309 Hemoglobin Quant: 14.7 [PB]   2310 Potassium: 4.4 [PB]   2310 Creatinine(!): 0.7 [PB]   2310 Total Bilirubin: 0.6 [PB]   2310 Alkaline Phosphatase: 98 [PB]   2310 ALT(!): 136 [PB]   2310 AST(!): 140  Slight elevation compared to prior. [PB]   2310 Troponin, High Sensitivity: 20 [PB]   2310 INR(!): 1.24 [PB]   2310 Nitrite, Urine: Negative [PB]   2310 Leukocyte Esterase, Urine: Negative [PB]   2310 CT HEAD WO CONTRAST  Negative for acute findings. [PB]   2310 CTA Head Neck W/Contrast  Negative for acute findings. [PB]   2310 XR CHEST (2 VW)  Negative for acute findings. [PB]   2310 EKG 12 Lead  A-fib with RVR, no change from prior. [PB]   2312 Patient is revisited bedside.  Remains in stable condition.  Discussed results and plan for admission.  Patient verbalizes understanding and agreement with plan. [PB]      ED Course User Index  [PB] Eduardo Bunch DO   All charted times are approximate.    During the patient's ED course, the patient was given:  Medications   ALPRAZolam (XANAX) tablet 0.5 mg (0.5 mg Oral Given 2/16/25 0237)   aspirin EC tablet 81 mg (has no administration in time range)   digoxin (LANOXIN) tablet 125 mcg (has no administration in time range)   levETIRAcetam (KEPPRA) tablet 500 mg (has no administration in time range)   metoprolol succinate (TOPROL XL) extended release tablet 50 mg (has no administration in time range)   oxyCODONE-acetaminophen (PERCOCET) 5-325 MG per tablet 1 tablet (has no administration in time range)   sacubitril-valsartan (ENTRESTO) 24-26 MG per tablet 1 tablet (1 tablet Oral Given 2/16/25 8651)   spironolactone

## 2025-02-16 NOTE — PLAN OF CARE
Problem: Chronic Conditions and Co-morbidities  Goal: Patient's chronic conditions and co-morbidity symptoms are monitored and maintained or improved  Outcome: Progressing  Flowsheets (Taken 2/16/2025 0141)  Care Plan - Patient's Chronic Conditions and Co-Morbidity Symptoms are Monitored and Maintained or Improved:   Monitor and assess patient's chronic conditions and comorbid symptoms for stability, deterioration, or improvement   Collaborate with multidisciplinary team to address chronic and comorbid conditions and prevent exacerbation or deterioration   Update acute care plan with appropriate goals if chronic or comorbid symptoms are exacerbated and prevent overall improvement and discharge     Problem: Discharge Planning  Goal: Discharge to home or other facility with appropriate resources  Outcome: Progressing  Flowsheets (Taken 2/16/2025 0141)  Discharge to home or other facility with appropriate resources:   Identify barriers to discharge with patient and caregiver   Arrange for needed discharge resources and transportation as appropriate   Identify discharge learning needs (meds, wound care, etc)   Refer to discharge planning if patient needs post-hospital services based on physician order or complex needs related to functional status, cognitive ability or social support system     Problem: Skin/Tissue Integrity  Goal: Skin integrity remains intact  Description: 1.  Monitor for areas of redness and/or skin breakdown  2.  Assess vascular access sites hourly  3.  Every 4-6 hours minimum:  Change oxygen saturation probe site  4.  Every 4-6 hours:  If on nasal continuous positive airway pressure, respiratory therapy assess nares and determine need for appliance change or resting period  Outcome: Progressing  Flowsheets (Taken 2/16/2025 0141)  Skin Integrity Remains Intact:   Monitor for areas of redness and/or skin breakdown   Assess vascular access sites hourly   Every 4-6 hours: If on nasal continuous  positive airway pressure, respiratory therapy assesses nares and determine need for appliance change or resting period   Every 4-6 hours minimum: Change oxygen saturation probe site     Problem: Safety - Adult  Goal: Free from fall injury  Outcome: Progressing     Problem: ABCDS Injury Assessment  Goal: Absence of physical injury  Outcome: Progressing

## 2025-02-16 NOTE — PROGRESS NOTES
Hospitalist Progress Note      PCP: Daniel Amador DO    Date of Admission: 2/15/2025    LOS: 0    Chief Complaint:   Chief Complaint   Patient presents with    Extremity Weakness     Pt came in from home, pt reports left sided weakness that started yesterday that he notices when he walks,       Case Summary:   54-year-old gentleman history of substance abuse, seizure disorder, hypertension, chronic systolic heart failure, atrial fibrillation, CAD, noncompliance with medications who was admitted with delayed presentation of neurological symptoms with left-sided weakness and dysarthria concerning for strokelike symptoms.      Active Hospital Problems    Diagnosis Date Noted    Dysarthria [R47.1] 02/16/2025    Left-sided weakness [R53.1] 02/16/2025    Atrial fibrillation with RVR (HCC) [I48.91] 02/16/2025    HTN (hypertension), benign [I10] 01/19/2024    Noncompliance with medications [Z91.148] 01/19/2024    Seizure disorder (HCC) [G40.909] 01/18/2024    Coronary artery disease involving native coronary artery of native heart without angina pectoris [I25.10] 09/13/2023         Principal Problem:    Strokelike symptoms with Dysarthria and Left-sided weakness: Symptoms ongoing for the last 4 days.  However he is not able to get by and was failing to use his wheelchair.  He is seeking placement.  - Get MRI brain  - PT OT evaluation  - Speech pathology evaluation reviewed discharge plan  - Social work consult in view of discharge planning  - Await neurology consult       Atrial fibrillation with RVR (HCC): Improved rate control on metoprolol, digoxin    Active Problems:    Coronary artery disease involving native coronary artery of native heart without angina pectoris: No chest pains or shortness of breath.  Continue antianginal therapy with aspirin, metoprolol, Entresto    Chronic systolic heart failure: Patient noncompliant with medication.  Remains euvolemic.  Continue metoprolol, Entresto, Aldactone

## 2025-02-16 NOTE — PLAN OF CARE
Problem: Chronic Conditions and Co-morbidities  Goal: Patient's chronic conditions and co-morbidity symptoms are monitored and maintained or improved  2/16/2025 1526 by Erica Wilson RN  Outcome: Progressing  Flowsheets (Taken 2/16/2025 0800)  Care Plan - Patient's Chronic Conditions and Co-Morbidity Symptoms are Monitored and Maintained or Improved:   Update acute care plan with appropriate goals if chronic or comorbid symptoms are exacerbated and prevent overall improvement and discharge   Collaborate with multidisciplinary team to address chronic and comorbid conditions and prevent exacerbation or deterioration   Monitor and assess patient's chronic conditions and comorbid symptoms for stability, deterioration, or improvement  2/16/2025 0635 by Oxana Luque, RN  Outcome: Progressing  Flowsheets (Taken 2/16/2025 0141)  Care Plan - Patient's Chronic Conditions and Co-Morbidity Symptoms are Monitored and Maintained or Improved:   Monitor and assess patient's chronic conditions and comorbid symptoms for stability, deterioration, or improvement   Collaborate with multidisciplinary team to address chronic and comorbid conditions and prevent exacerbation or deterioration   Update acute care plan with appropriate goals if chronic or comorbid symptoms are exacerbated and prevent overall improvement and discharge     Problem: Discharge Planning  Goal: Discharge to home or other facility with appropriate resources  2/16/2025 1526 by Erica Wilson, RN  Outcome: Progressing  Flowsheets (Taken 2/16/2025 0800)  Discharge to home or other facility with appropriate resources: Identify barriers to discharge with patient and caregiver  2/16/2025 0635 by Oxana Luque, RN  Outcome: Progressing  Flowsheets (Taken 2/16/2025 0141)  Discharge to home or other facility with appropriate resources:   Identify barriers to discharge with patient and caregiver   Arrange for needed discharge resources and transportation as

## 2025-02-17 ENCOUNTER — APPOINTMENT (OUTPATIENT)
Age: 55
DRG: 861 | End: 2025-02-17
Attending: INTERNAL MEDICINE
Payer: MEDICAID

## 2025-02-17 ENCOUNTER — APPOINTMENT (OUTPATIENT)
Dept: MRI IMAGING | Age: 55
DRG: 861 | End: 2025-02-17
Payer: MEDICAID

## 2025-02-17 LAB
ANION GAP SERPL CALCULATED.3IONS-SCNC: 9 MMOL/L (ref 3–16)
BUN SERPL-MCNC: 13 MG/DL (ref 7–20)
CALCIUM SERPL-MCNC: 8.9 MG/DL (ref 8.3–10.6)
CHLORIDE SERPL-SCNC: 106 MMOL/L (ref 99–110)
CHOLEST SERPL-MCNC: 110 MG/DL (ref 0–199)
CK SERPL-CCNC: 63 U/L (ref 39–308)
CO2 SERPL-SCNC: 24 MMOL/L (ref 21–32)
CREAT SERPL-MCNC: 0.8 MG/DL (ref 0.9–1.3)
CRP SERPL-MCNC: <3 MG/L (ref 0–5.1)
DEPRECATED RDW RBC AUTO: 14 % (ref 12.4–15.4)
ECHO AO ROOT DIAM: 2.7 CM
ECHO AO ROOT INDEX: 1.25 CM/M2
ECHO AV AREA PEAK VELOCITY: 2.2 CM2
ECHO AV AREA VTI: 1.8 CM2
ECHO AV AREA/BSA PEAK VELOCITY: 1 CM2/M2
ECHO AV AREA/BSA VTI: 0.8 CM2/M2
ECHO AV MEAN GRADIENT: 4 MMHG
ECHO AV MEAN VELOCITY: 1 M/S
ECHO AV PEAK GRADIENT: 6 MMHG
ECHO AV PEAK VELOCITY: 1.2 M/S
ECHO AV VELOCITY RATIO: 0.67
ECHO AV VTI: 23.1 CM
ECHO BSA: 2.17 M2
ECHO EST RA PRESSURE: 3 MMHG
ECHO LA AREA 2C: 23.6 CM2
ECHO LA AREA 4C: 23.7 CM2
ECHO LA MAJOR AXIS: 6.4 CM
ECHO LA MINOR AXIS: 6.4 CM
ECHO LA VOL BP: 70 ML (ref 18–58)
ECHO LA VOL MOD A2C: 71 ML (ref 18–58)
ECHO LA VOL MOD A4C: 69 ML (ref 18–58)
ECHO LA VOL/BSA BIPLANE: 32 ML/M2 (ref 16–34)
ECHO LA VOLUME INDEX MOD A2C: 33 ML/M2 (ref 16–34)
ECHO LA VOLUME INDEX MOD A4C: 32 ML/M2 (ref 16–34)
ECHO LV EDV A2C: 96 ML
ECHO LV EDV A4C: 123 ML
ECHO LV EDV INDEX A4C: 57 ML/M2
ECHO LV EDV NDEX A2C: 44 ML/M2
ECHO LV EJECTION FRACTION A2C: 59 %
ECHO LV EJECTION FRACTION A4C: 61 %
ECHO LV EJECTION FRACTION BIPLANE: 59 % (ref 55–100)
ECHO LV ESV A2C: 40 ML
ECHO LV ESV A4C: 48 ML
ECHO LV ESV INDEX A2C: 19 ML/M2
ECHO LV ESV INDEX A4C: 22 ML/M2
ECHO LV FRACTIONAL SHORTENING: 29 % (ref 28–44)
ECHO LV INTERNAL DIMENSION DIASTOLE INDEX: 1.9 CM/M2
ECHO LV INTERNAL DIMENSION DIASTOLIC: 4.1 CM (ref 4.2–5.9)
ECHO LV INTERNAL DIMENSION SYSTOLIC INDEX: 1.34 CM/M2
ECHO LV INTERNAL DIMENSION SYSTOLIC: 2.9 CM
ECHO LV IVSD: 1 CM (ref 0.6–1)
ECHO LV MASS 2D: 123 G (ref 88–224)
ECHO LV MASS INDEX 2D: 56.9 G/M2 (ref 49–115)
ECHO LV POSTERIOR WALL DIASTOLIC: 0.9 CM (ref 0.6–1)
ECHO LV RELATIVE WALL THICKNESS RATIO: 0.44
ECHO LVOT AREA: 3.5 CM2
ECHO LVOT AV VTI INDEX: 0.51
ECHO LVOT DIAM: 2.1 CM
ECHO LVOT MEAN GRADIENT: 1 MMHG
ECHO LVOT MEAN GRADIENT: 1 MMHG
ECHO LVOT PEAK GRADIENT: 2 MMHG
ECHO LVOT PEAK VELOCITY: 0.8 M/S
ECHO LVOT STROKE VOLUME INDEX: 18.8 ML/M2
ECHO LVOT SV: 40.5 ML
ECHO LVOT VTI: 11.7 CM
ECHO MV E VELOCITY: 0.65 M/S
ECHO PV MAX VELOCITY: 0.6 M/S
ECHO PV PEAK GRADIENT: 1 MMHG
ECHO RA AREA 4C: 21.6 CM2
ECHO RA END SYSTOLIC VOLUME APICAL 4 CHAMBER INDEX BSA: 31 ML/M2
ECHO RA VOLUME: 68 ML
ECHO RIGHT VENTRICULAR SYSTOLIC PRESSURE (RVSP): 18 MMHG
ECHO RV BASAL DIMENSION: 3.6 CM
ECHO RV FREE WALL PEAK S': 9.4 CM/S
ECHO RV MID DIMENSION: 2.3 CM
ECHO RV TAPSE: 1.7 CM (ref 1.7–?)
ECHO TV REGURGITANT MAX VELOCITY: 1.94 M/S
ECHO TV REGURGITANT PEAK GRADIENT: 15 MMHG
ERYTHROCYTE [SEDIMENTATION RATE] IN BLOOD BY WESTERGREN METHOD: 10 MM/HR (ref 0–20)
EST. AVERAGE GLUCOSE BLD GHB EST-MCNC: 88.2 MG/DL
GFR SERPLBLD CREATININE-BSD FMLA CKD-EPI: >90 ML/MIN/{1.73_M2}
GLUCOSE SERPL-MCNC: 112 MG/DL (ref 70–99)
HBA1C MFR BLD: 4.7 %
HCT VFR BLD AUTO: 42.8 % (ref 40.5–52.5)
HDLC SERPL-MCNC: 31 MG/DL (ref 40–60)
HGB BLD-MCNC: 14.3 G/DL (ref 13.5–17.5)
LDLC SERPL CALC-MCNC: 54 MG/DL
MCH RBC QN AUTO: 31.3 PG (ref 26–34)
MCHC RBC AUTO-ENTMCNC: 33.5 G/DL (ref 31–36)
MCV RBC AUTO: 93.3 FL (ref 80–100)
PLATELET # BLD AUTO: 115 K/UL (ref 135–450)
PMV BLD AUTO: 8.5 FL (ref 5–10.5)
POTASSIUM SERPL-SCNC: 4.4 MMOL/L (ref 3.5–5.1)
RBC # BLD AUTO: 4.59 M/UL (ref 4.2–5.9)
SODIUM SERPL-SCNC: 139 MMOL/L (ref 136–145)
TRIGL SERPL-MCNC: 127 MG/DL (ref 0–150)
VLDLC SERPL CALC-MCNC: 25 MG/DL
WBC # BLD AUTO: 4.4 K/UL (ref 4–11)

## 2025-02-17 PROCEDURE — 82550 ASSAY OF CK (CPK): CPT

## 2025-02-17 PROCEDURE — 2060000000 HC ICU INTERMEDIATE R&B

## 2025-02-17 PROCEDURE — 93306 TTE W/DOPPLER COMPLETE: CPT | Performed by: INTERNAL MEDICINE

## 2025-02-17 PROCEDURE — 6360000002 HC RX W HCPCS: Performed by: NURSE PRACTITIONER

## 2025-02-17 PROCEDURE — 99223 1ST HOSP IP/OBS HIGH 75: CPT | Performed by: PSYCHIATRY & NEUROLOGY

## 2025-02-17 PROCEDURE — C8929 TTE W OR WO FOL WCON,DOPPLER: HCPCS

## 2025-02-17 PROCEDURE — 80061 LIPID PANEL: CPT

## 2025-02-17 PROCEDURE — 83036 HEMOGLOBIN GLYCOSYLATED A1C: CPT

## 2025-02-17 PROCEDURE — 86140 C-REACTIVE PROTEIN: CPT

## 2025-02-17 PROCEDURE — 85027 COMPLETE CBC AUTOMATED: CPT

## 2025-02-17 PROCEDURE — 2500000003 HC RX 250 WO HCPCS: Performed by: NURSE PRACTITIONER

## 2025-02-17 PROCEDURE — 80048 BASIC METABOLIC PNL TOTAL CA: CPT

## 2025-02-17 PROCEDURE — 85652 RBC SED RATE AUTOMATED: CPT

## 2025-02-17 PROCEDURE — 70551 MRI BRAIN STEM W/O DYE: CPT

## 2025-02-17 PROCEDURE — 6370000000 HC RX 637 (ALT 250 FOR IP): Performed by: NURSE PRACTITIONER

## 2025-02-17 PROCEDURE — 6360000004 HC RX CONTRAST MEDICATION: Performed by: INTERNAL MEDICINE

## 2025-02-17 PROCEDURE — 6360000002 HC RX W HCPCS: Performed by: INTERNAL MEDICINE

## 2025-02-17 PROCEDURE — 6370000000 HC RX 637 (ALT 250 FOR IP): Performed by: INTERNAL MEDICINE

## 2025-02-17 PROCEDURE — 97166 OT EVAL MOD COMPLEX 45 MIN: CPT

## 2025-02-17 PROCEDURE — 36415 COLL VENOUS BLD VENIPUNCTURE: CPT

## 2025-02-17 PROCEDURE — 97530 THERAPEUTIC ACTIVITIES: CPT

## 2025-02-17 RX ORDER — GABAPENTIN 300 MG/1
600 CAPSULE ORAL 3 TIMES DAILY
Status: DISCONTINUED | OUTPATIENT
Start: 2025-02-17 | End: 2025-02-18

## 2025-02-17 RX ORDER — DIAZEPAM 10 MG/2ML
5 INJECTION, SOLUTION INTRAMUSCULAR; INTRAVENOUS ONCE
Status: COMPLETED | OUTPATIENT
Start: 2025-02-17 | End: 2025-02-17

## 2025-02-17 RX ORDER — CLONAZEPAM 1 MG/1
1 TABLET ORAL EVERY 12 HOURS
Status: DISCONTINUED | OUTPATIENT
Start: 2025-02-17 | End: 2025-02-18

## 2025-02-17 RX ORDER — BUPRENORPHINE AND NALOXONE 8; 2 MG/1; MG/1
1 FILM, SOLUBLE BUCCAL; SUBLINGUAL 2 TIMES DAILY
Status: DISCONTINUED | OUTPATIENT
Start: 2025-02-17 | End: 2025-02-19 | Stop reason: HOSPADM

## 2025-02-17 RX ADMIN — CLONAZEPAM 1 MG: 1 TABLET ORAL at 12:19

## 2025-02-17 RX ADMIN — CLONAZEPAM 1 MG: 1 TABLET ORAL at 21:43

## 2025-02-17 RX ADMIN — APIXABAN 5 MG: 5 TABLET, FILM COATED ORAL at 12:21

## 2025-02-17 RX ADMIN — GABAPENTIN 600 MG: 300 CAPSULE ORAL at 21:40

## 2025-02-17 RX ADMIN — ALPRAZOLAM 0.5 MG: 0.5 TABLET ORAL at 15:16

## 2025-02-17 RX ADMIN — DIGOXIN 125 MCG: 125 TABLET ORAL at 07:58

## 2025-02-17 RX ADMIN — DIAZEPAM 5 MG: 5 INJECTION, SOLUTION INTRAMUSCULAR; INTRAVENOUS at 10:41

## 2025-02-17 RX ADMIN — BUPRENORPHINE AND NALOXONE 1 FILM: 8; 2 FILM, SOLUBLE BUCCAL; SUBLINGUAL at 21:43

## 2025-02-17 RX ADMIN — LEVETIRACETAM 500 MG: 500 TABLET, FILM COATED ORAL at 07:58

## 2025-02-17 RX ADMIN — ALPRAZOLAM 0.5 MG: 0.5 TABLET ORAL at 21:40

## 2025-02-17 RX ADMIN — OXYCODONE HYDROCHLORIDE AND ACETAMINOPHEN 1 TABLET: 5; 325 TABLET ORAL at 07:57

## 2025-02-17 RX ADMIN — OXYCODONE HYDROCHLORIDE AND ACETAMINOPHEN 1 TABLET: 5; 325 TABLET ORAL at 23:18

## 2025-02-17 RX ADMIN — SACUBITRIL AND VALSARTAN 1 TABLET: 24; 26 TABLET, FILM COATED ORAL at 21:43

## 2025-02-17 RX ADMIN — SPIRONOLACTONE 25 MG: 25 TABLET ORAL at 07:58

## 2025-02-17 RX ADMIN — GABAPENTIN 600 MG: 300 CAPSULE ORAL at 12:19

## 2025-02-17 RX ADMIN — METOPROLOL SUCCINATE 50 MG: 50 TABLET, FILM COATED, EXTENDED RELEASE ORAL at 07:58

## 2025-02-17 RX ADMIN — OXYCODONE HYDROCHLORIDE AND ACETAMINOPHEN 1 TABLET: 5; 325 TABLET ORAL at 15:16

## 2025-02-17 RX ADMIN — APIXABAN 5 MG: 5 TABLET, FILM COATED ORAL at 21:44

## 2025-02-17 RX ADMIN — ASPIRIN 81 MG: 81 TABLET, COATED ORAL at 07:58

## 2025-02-17 RX ADMIN — SACUBITRIL AND VALSARTAN 1 TABLET: 24; 26 TABLET, FILM COATED ORAL at 07:57

## 2025-02-17 RX ADMIN — SODIUM CHLORIDE, PRESERVATIVE FREE 10 ML: 5 INJECTION INTRAVENOUS at 07:59

## 2025-02-17 RX ADMIN — BUPRENORPHINE AND NALOXONE 1 FILM: 8; 2 FILM, SOLUBLE BUCCAL; SUBLINGUAL at 12:19

## 2025-02-17 RX ADMIN — ALPRAZOLAM 0.5 MG: 0.5 TABLET ORAL at 07:58

## 2025-02-17 RX ADMIN — SULFUR HEXAFLUORIDE 2 ML: 60.7; .19; .19 INJECTION, POWDER, LYOPHILIZED, FOR SUSPENSION INTRAVENOUS; INTRAVESICAL at 15:13

## 2025-02-17 ASSESSMENT — PAIN SCALES - GENERAL
PAINLEVEL_OUTOF10: 8
PAINLEVEL_OUTOF10: 6
PAINLEVEL_OUTOF10: 6
PAINLEVEL_OUTOF10: 0
PAINLEVEL_OUTOF10: 2

## 2025-02-17 ASSESSMENT — PAIN DESCRIPTION - LOCATION
LOCATION: LEG
LOCATION: HIP;LEG

## 2025-02-17 ASSESSMENT — PAIN DESCRIPTION - PAIN TYPE: TYPE: CHRONIC PAIN

## 2025-02-17 ASSESSMENT — PAIN DESCRIPTION - DESCRIPTORS: DESCRIPTORS: SHARP

## 2025-02-17 ASSESSMENT — PAIN SCALES - WONG BAKER: WONGBAKER_NUMERICALRESPONSE: NO HURT

## 2025-02-17 ASSESSMENT — PAIN DESCRIPTION - ORIENTATION: ORIENTATION: LEFT

## 2025-02-17 NOTE — PLAN OF CARE
Problem: Chronic Conditions and Co-morbidities  Goal: Patient's chronic conditions and co-morbidity symptoms are monitored and maintained or improved  2/17/2025 0907 by Yulia Garza RN  Outcome: Progressing  2/17/2025 0123 by Adrian Harrison RN  Outcome: Progressing  Flowsheets (Taken 2/17/2025 0123)  Care Plan - Patient's Chronic Conditions and Co-Morbidity Symptoms are Monitored and Maintained or Improved: Monitor and assess patient's chronic conditions and comorbid symptoms for stability, deterioration, or improvement     Problem: Discharge Planning  Goal: Discharge to home or other facility with appropriate resources  2/17/2025 0907 by Yulia Garza RN  Outcome: Progressing  2/17/2025 0123 by Adrian Harrison RN  Outcome: Progressing  Flowsheets (Taken 2/17/2025 0123)  Discharge to home or other facility with appropriate resources: Identify discharge learning needs (meds, wound care, etc)     Problem: Skin/Tissue Integrity  Goal: Skin integrity remains intact  Description: 1.  Monitor for areas of redness and/or skin breakdown  2.  Assess vascular access sites hourly  3.  Every 4-6 hours minimum:  Change oxygen saturation probe site  4.  Every 4-6 hours:  If on nasal continuous positive airway pressure, respiratory therapy assess nares and determine need for appliance change or resting period  2/17/2025 0907 by Yulia Garza RN  Outcome: Progressing  2/17/2025 0123 by Adrian Harrison RN  Outcome: Progressing     Problem: Safety - Adult  Goal: Free from fall injury  2/17/2025 0907 by Yulia Garza RN  Outcome: Progressing  2/17/2025 0123 by Adrian Harrison RN  Outcome: Progressing     Problem: ABCDS Injury Assessment  Goal: Absence of physical injury  2/17/2025 0907 by Yulia Garza RN  Outcome: Progressing  2/17/2025 0123 by Adrian Harrison RN  Outcome: Progressing  Flowsheets (Taken 2/17/2025 0123)  Absence of Physical Injury: Implement safety measures based on

## 2025-02-17 NOTE — CARE COORDINATION
Case Management Assessment  Initial Evaluation    Date/Time of Evaluation: 2/17/2025 2:58 PM  Assessment Completed by: Fabricio Barker    If patient is discharged prior to next notation, then this note serves as note for discharge by case management.    Patient Name: Lazaro Resendiz                   YOB: 1970  Diagnosis: Dysarthria [R47.1]                   Date / Time: 2/15/2025  8:59 PM    Patient Admission Status: Inpatient   Readmission Risk (Low < 19, Mod (19-27), High > 27): Readmission Risk Score: 12.7    Current PCP: Daniel Amador, DO  PCP verified by CM? Yes    Chart Reviewed: Yes      History Provided by: Patient  Patient Orientation: Alert and Oriented    Patient Cognition: Alert    Hospitalization in the last 30 days (Readmission):  No    If yes, Readmission Assessment in CM Navigator will be completed.    Advance Directives:      Code Status: Full Code   Patient's Primary Decision Maker is: Legal Next of Kin (Esa daughter)      Discharge Planning:    Patient lives with: Alone, Family Members Type of Home: House  Primary Care Giver: Self  Patient Support Systems include: Children, Family Members   Current Financial resources: Medicaid  Current community resources: None  Current services prior to admission: None            Current DME:              Type of Home Care services:  None    ADLS  Prior functional level: Independent in ADLs/IADLs  Current functional level: Assistance with the following:, Mobility, Housework, Cooking    PT AM-PAC: 17 /24  OT AM-PAC: 16 /24    Family can provide assistance at DC: Yes  Would you like Case Management to discuss the discharge plan with any other family members/significant others, and if so, who? Yes (daughter Esa)  Plans to Return to Present Housing: Yes  Other Identified Issues/Barriers to RETURNING to current housing: none  Potential Assistance needed at discharge: Skilled Nursing Facility (pt has list)            Potential DME:  none  Patient    Cognition Alert   History Provided By Patient   Primary Caregiver Self   Accompanied By/Relationship self   Support Systems Children;Family Members   Patient's Healthcare Decision Maker is: Legal Next of Kin  (Esa daughter)   PCP Verified by CM Yes   Last Visit to PCP Within last 6 months   Prior Functional Level Independent in ADLs/IADLs   Current Functional Level Assistance with the following:;Mobility;Housework;Cooking   Can patient return to prior living arrangement Yes   Ability to make needs known: Good   Family able to assist with home care needs: Yes   Would you like for me to discuss the discharge plan with any other family members/significant others, and if so, who? Yes  (daughter Esa)   Financial Resources Medicaid   Community Resources None   CM/SW Referral Other (see comment)  (DC assistance and planning)   Discharge Planning   Type of Residence House   Living Arrangements Alone;Family Members   Current Services Prior To Admission None   Potential Assistance Needed Skilled Nursing Facility  (pt has list)   DME Ordered? No   Potential Assistance Purchasing Medications No   Type of Home Care Services None   Patient expects to be discharged to: Skilled nursing facility   One/Two Story Residence One story  (no steps to get into the house)   Services At/After Discharge   Transition of Care Consult (CM Consult) SNF   Services At/After Discharge Skilled Nursing Facility (SNF)   Mode of Transport at Discharge BLS  (for SNF)   Condition of Participation: Discharge Planning   The Plan for Transition of Care is related to the following treatment goals: strengthening   Freedom of Choice list was provided with basic dialogue that supports the patient's individualized plan of care/goals, treatment preferences, and shares the quality data associated with the providers?  Yes

## 2025-02-17 NOTE — PROGRESS NOTES
Physical Therapy  Physical Therapy  Attempt Note    Name:Lazaro Resendiz  :1970  MRN:[id number]  Room: [facility]3369/3369-01    Date of Service: 2025    Patient chart reviewed. PT attempted to see for PT tx at this time. Pt supine in bed with lights off upon therapist entrance into room. Pt stating \"please come back later, I just can't right now\". PT attempted to educated pt on benefits of OOB mobility and  participation in PT with pt continuing to request therapy return later. Will attempt again as therapy schedule permits.              Electronically signed by Diana Serra PT on 2025 at 11:45 AM

## 2025-02-17 NOTE — CONSULTS
In patient Neurology consult        [facility]      MD Lazaro Jacobs  1970    Date of Service: 2/17/2025    Referring Physician: Giovani Dash MD      Reason for the consult and CC: New onset dysarthria and possible stroke    HPI:   The patient is a 54 y.o.  years old male with history of A-fib, hypertension and seizure disorder who is admitted to the hospital over the weekend with acute dysarthria and left-sided weakness.  Symptoms started 3 days ago.  Degree was severe and Duration was persistent.  Description difficulty with walking, leaning toward the left and speech disturbance.  No other relieving or aggravating factors.  No falling or witnessed seizure.  He came to the ED where he was admitted.  Further imaging with CT showed no LVO.  He was found to be in A-fib with RVR.  No new symptoms today.       Constitutional:   Vitals:    02/17/25 0745 02/17/25 0800 02/17/25 0827 02/17/25 1130   BP: 93/64   97/64   Pulse: (!) 109   98   Resp: 18  18 18   Temp: 97.2 °F (36.2 °C)   97.2 °F (36.2 °C)   TempSrc: Oral   Oral   SpO2: 93%   92%   Weight:  91.3 kg (201 lb 4.5 oz)     Height:             I personally reviewed and updated social history, past medical history, medications, allergy, surgical history, and family history as documented in the patient's electronic health records.       ROS: 10-14 ROS reviewed with the patient/nurse/family which were unremarkable except mentioned in H&P.    General appearance:  Normal development and appear in no acute distress.   Mental Status:   Oriented to person, place, problem, and time.    Memory: Good immediate recall.  Intact remote memory  Normal attention span and concentration.  Language: intact naming, repeating and fluency   Good fund of Knowledge. Aware of current events and vocabulary   Cranial Nerves:   II: Visual fields: Full. Pupils: equal, round, reactive to light, bilaterally  III,IV,VI: Extra Ocular Movements are

## 2025-02-17 NOTE — PROGRESS NOTES
all homemaking tasks  Active :        [] Yes                 [] No  Hand Dominance: [] Left                 [x] Right  Current Employment: unemployed  Recent Falls: at least 30 more falls in the last 6 mo since last discharge. Total of 70 falls since femur fracture   Comments:  - in June 2023, pt reports he was in an IPR   - pt reports a recent decline from December 26, 2023 when he was walking w/ PT/OT w/ a RW. Pt reports increased seizure activities and falls and now is dependent on wheelchair.   - pt is currently in between places.      Information obtained from prior therapy evaluation in January of 2024 and confirmed/updated during session    Examination   Vision:   Vision Gross Assessment: Impaired and Vision Corrective Device: wears glasses for reading    Visual assessment conducted during evaluation. Pt presents with impaired tracking in all visual planes and presents with jumpy eye movements and lacks smooth pursuits. Pt with no reports of acute eye fatigue, blurry, or double vision.   Hearing:   [facility], reports he is \"sometimes\" [place], hasn't had his hearing checked in years   Perception:   WFL  Observation:   General Observation:  telemetry, pt on RA  Posture:   Fair  Sensation:   denies numbness and tingling, reports improved in (L) UE from admission  Proprioception:    diminished proprioception in (L) UE  Tone:   Normotonic  Coordination Testing:   Coordination and Movement Description: fine motor impairments, apraxia, decreased accuracy  Finger to Nose: Impaired on Left  Alternating Pronation/Supination: Impaired on Left  Finger/Thumb Opposition: Impaired on Left    ROM:   (B) UE AROM WFL  Strength:   (L) Shoulder: -4     (R) Shoulder: +4  (L) Elbow: -4     (R) Elbow: +4  (L) Wrist: -4     (R) Wrist: +4  (L) Hand: -4      (R) Hand: +4    Therapist Clinical Decision Making (Complexity): medium complexity  Clinical Presentation: evolving      Subjective  General: Pt in stance with nursing student  attention  Memory: decreased short term memory  Safety Judgement: decreased awareness of need for assistance, decreased awareness of need for safety  Problem Solving: assistance required to generate solutions, assistance required to implement solutions  Insights: decreased awareness of deficits  Initiation: requires cues for some  Sequencing: requires cues for some  Comments: decreased insight into current deficits  Orientation:    oriented to person, oriented to place, oriented to situation, and disoriented to time , pt stated February 2024  Command Following:   accurately follows one step commands     Education  Barriers To Learning: cognition  Patient Education: patient educated on goals, OT role and benefits, plan of care, ADL adaptive strategies, proper use of assistive device/equipment, disease specific education, transfer training, discharge recommendations  Learning Assessment:  patient verbalizes understanding, would benefit from continued reinforcement    Assessment  Activity Tolerance: Good  Impairments Requiring Therapeutic Intervention: decreased functional mobility, decreased ADL status, decreased safety awareness, decreased cognition, decreased endurance, decreased balance, decreased IADL, decreased fine motor control, decreased coordination  Prognosis: good  Clinical Assessment: The patient is a 54 y.o. male who presents below their baseline level of function due to above deficits, associated with Dysarthria. Typically, pt is mod I with RW, and independent with ADL/requires assist with IADL. Pt has limited social support at d/c. Currently, pt is CGA for fxl mobility/transfers, CGA for ADL completion. Pt currently limited by decreased balance, coordination, and strength. Pt is currently at risk of falls and has had a history of 70+ falls since his hip fx sx in September of 2023. Pt is unsafe to return home at this time. Continued OT indicated in order to promote return to PLOF   Safety Interventions:

## 2025-02-17 NOTE — PLAN OF CARE
Problem: Chronic Conditions and Co-morbidities  Goal: Patient's chronic conditions and co-morbidity symptoms are monitored and maintained or improved  2/17/2025 0123 by Adrian Harrison RN  Outcome: Progressing  Flowsheets (Taken 2/17/2025 0123)  Care Plan - Patient's Chronic Conditions and Co-Morbidity Symptoms are Monitored and Maintained or Improved: Monitor and assess patient's chronic conditions and comorbid symptoms for stability, deterioration, or improvement     Problem: Discharge Planning  Goal: Discharge to home or other facility with appropriate resources  2/17/2025 0123 by Adrian Harrison RN  Outcome: Progressing  Flowsheets (Taken 2/17/2025 0123)  Discharge to home or other facility with appropriate resources: Identify discharge learning needs (meds, wound care, etc)     Problem: Skin/Tissue Integrity  Goal: Skin integrity remains intact  Description: 1.  Monitor for areas of redness and/or skin breakdown  2.  Assess vascular access sites hourly  3.  Every 4-6 hours minimum:  Change oxygen saturation probe site  4.  Every 4-6 hours:  If on nasal continuous positive airway pressure, respiratory therapy assess nares and determine need for appliance change or resting period  2/17/2025 0123 by Adrian Harrison, RN  Outcome: Progressing     Problem: Safety - Adult  Goal: Free from fall injury  2/17/2025 0123 by Adrian Harrison RN  Outcome: Progressing     Problem: ABCDS Injury Assessment  Goal: Absence of physical injury  2/17/2025 0123 by Adrian Harrison RN  Outcome: Progressing  Flowsheets (Taken 2/17/2025 0123)  Absence of Physical Injury: Implement safety measures based on patient assessment     Problem: Pain  Goal: Verbalizes/displays adequate comfort level or baseline comfort level  2/17/2025 0123 by Adrian Harrison, RN  Outcome: Progressing

## 2025-02-17 NOTE — PROGRESS NOTES
Hospitalist Progress Note      PCP: Daniel Amador DO    Date of Admission: 2/15/2025    LOS: 1    Chief Complaint:   Chief Complaint   Patient presents with    Extremity Weakness     Pt came in from home, pt reports left sided weakness that started yesterday that he notices when he walks,       Case Summary:   54-year-old gentleman history of substance abuse, seizure disorder, hypertension, chronic systolic heart failure, atrial fibrillation, CAD, noncompliance with medications who was admitted with delayed presentation of neurological symptoms with left-sided weakness and dysarthria concerning for strokelike symptoms.      Active Hospital Problems    Diagnosis Date Noted    Dysarthria [R47.1] 02/16/2025    Left-sided weakness [R53.1] 02/16/2025    Atrial fibrillation with RVR (HCC) [I48.91] 02/16/2025    HTN (hypertension), benign [I10] 01/19/2024    Noncompliance with medications [Z91.148] 01/19/2024    Seizure disorder (HCC) [G40.909] 01/18/2024    Coronary artery disease involving native coronary artery of native heart without angina pectoris [I25.10] 09/13/2023         Principal Problem:    Strokelike symptoms with Dysarthria and Left-sided weakness: Symptoms ongoing for the last 4 days.  However he is not able to get by and was failing to use his wheelchair.  He is seeking placement.  Patient reports half an hour after taking Keppra, he feels unsteady on his feet and would like to stop if possible.  - Await MRI brain  - Seen PT OT recommending SNF  - Await neurology consult       Atrial fibrillation with RVR (HCC): Improved rate control on metoprolol, digoxin    Active Problems:    Coronary artery disease involving native coronary artery of native heart without angina pectoris: No chest pains or shortness of breath.  Continue antianginal therapy with aspirin, metoprolol, Entresto    Chronic systolic heart failure: Patient noncompliant with medication.  Remains euvolemic.  Continue metoprolol,

## 2025-02-18 PROBLEM — I48.0 PAF (PAROXYSMAL ATRIAL FIBRILLATION) (HCC): Status: ACTIVE | Noted: 2025-02-18

## 2025-02-18 PROBLEM — I63.9 CEREBROVASCULAR ACCIDENT (CVA) (HCC): Status: ACTIVE | Noted: 2025-02-18

## 2025-02-18 PROCEDURE — APPNB30 APP NON BILLABLE TIME 0-30 MINS

## 2025-02-18 PROCEDURE — 6370000000 HC RX 637 (ALT 250 FOR IP): Performed by: INTERNAL MEDICINE

## 2025-02-18 PROCEDURE — 99232 SBSQ HOSP IP/OBS MODERATE 35: CPT | Performed by: PSYCHIATRY & NEUROLOGY

## 2025-02-18 PROCEDURE — APPSS30 APP SPLIT SHARED TIME 16-30 MINUTES

## 2025-02-18 PROCEDURE — 6370000000 HC RX 637 (ALT 250 FOR IP): Performed by: NURSE PRACTITIONER

## 2025-02-18 PROCEDURE — 6370000000 HC RX 637 (ALT 250 FOR IP): Performed by: PHYSICIAN ASSISTANT

## 2025-02-18 PROCEDURE — 2500000003 HC RX 250 WO HCPCS: Performed by: NURSE PRACTITIONER

## 2025-02-18 PROCEDURE — 2060000000 HC ICU INTERMEDIATE R&B

## 2025-02-18 RX ORDER — SACUBITRIL AND VALSARTAN 24; 26 MG/1; MG/1
0.5 TABLET, FILM COATED ORAL 2 TIMES DAILY
Qty: 60 TABLET | Refills: 3
Start: 2025-02-18 | End: 2025-02-19 | Stop reason: HOSPADM

## 2025-02-18 RX ORDER — BUPRENORPHINE AND NALOXONE 8; 2 MG/1; MG/1
1 FILM, SOLUBLE BUCCAL; SUBLINGUAL 2 TIMES DAILY
Qty: 6 FILM | Refills: 0 | Status: SHIPPED | OUTPATIENT
Start: 2025-02-18 | End: 2025-02-19 | Stop reason: HOSPADM

## 2025-02-18 RX ORDER — SPIRONOLACTONE 25 MG/1
12.5 TABLET ORAL DAILY
Status: DISCONTINUED | OUTPATIENT
Start: 2025-02-19 | End: 2025-02-19

## 2025-02-18 RX ORDER — SACUBITRIL AND VALSARTAN 24; 26 MG/1; MG/1
0.5 TABLET, FILM COATED ORAL 2 TIMES DAILY
Status: DISCONTINUED | OUTPATIENT
Start: 2025-02-18 | End: 2025-02-19

## 2025-02-18 RX ORDER — CLONAZEPAM 0.5 MG/1
0.5 TABLET ORAL EVERY 12 HOURS SCHEDULED
Status: DISCONTINUED | OUTPATIENT
Start: 2025-02-18 | End: 2025-02-19 | Stop reason: HOSPADM

## 2025-02-18 RX ORDER — GABAPENTIN 300 MG/1
300 CAPSULE ORAL 3 TIMES DAILY
Qty: 9 CAPSULE | Refills: 0
Start: 2025-02-18 | End: 2025-02-19 | Stop reason: HOSPADM

## 2025-02-18 RX ORDER — GABAPENTIN 300 MG/1
300 CAPSULE ORAL 3 TIMES DAILY
Status: DISCONTINUED | OUTPATIENT
Start: 2025-02-18 | End: 2025-02-19 | Stop reason: HOSPADM

## 2025-02-18 RX ORDER — CLONAZEPAM 1 MG/1
1 TABLET ORAL EVERY 12 HOURS
Qty: 6 TABLET | Refills: 0 | Status: SHIPPED | OUTPATIENT
Start: 2025-02-18 | End: 2025-02-19 | Stop reason: HOSPADM

## 2025-02-18 RX ORDER — ALPRAZOLAM 0.5 MG
0.5 TABLET ORAL 3 TIMES DAILY PRN
Qty: 3 TABLET | Refills: 0 | Status: SHIPPED | OUTPATIENT
Start: 2025-02-18 | End: 2025-02-19

## 2025-02-18 RX ADMIN — BUPRENORPHINE AND NALOXONE 1 FILM: 8; 2 FILM, SOLUBLE BUCCAL; SUBLINGUAL at 20:54

## 2025-02-18 RX ADMIN — GABAPENTIN 600 MG: 300 CAPSULE ORAL at 13:14

## 2025-02-18 RX ADMIN — CLONAZEPAM 0.5 MG: 0.5 TABLET ORAL at 20:54

## 2025-02-18 RX ADMIN — OXYCODONE HYDROCHLORIDE AND ACETAMINOPHEN 1 TABLET: 5; 325 TABLET ORAL at 08:53

## 2025-02-18 RX ADMIN — SODIUM CHLORIDE, PRESERVATIVE FREE 10 ML: 5 INJECTION INTRAVENOUS at 08:54

## 2025-02-18 RX ADMIN — CLONAZEPAM 1 MG: 1 TABLET ORAL at 11:11

## 2025-02-18 RX ADMIN — ALPRAZOLAM 0.5 MG: 0.5 TABLET ORAL at 21:02

## 2025-02-18 RX ADMIN — ALPRAZOLAM 0.5 MG: 0.5 TABLET ORAL at 05:16

## 2025-02-18 RX ADMIN — GABAPENTIN 600 MG: 300 CAPSULE ORAL at 08:53

## 2025-02-18 RX ADMIN — ALPRAZOLAM 0.5 MG: 0.5 TABLET ORAL at 13:14

## 2025-02-18 RX ADMIN — SPIRONOLACTONE 25 MG: 25 TABLET ORAL at 11:11

## 2025-02-18 RX ADMIN — GABAPENTIN 300 MG: 300 CAPSULE ORAL at 20:54

## 2025-02-18 RX ADMIN — APIXABAN 5 MG: 5 TABLET, FILM COATED ORAL at 08:52

## 2025-02-18 RX ADMIN — SACUBITRIL AND VALSARTAN 0.5 TABLET: 24; 26 TABLET, FILM COATED ORAL at 20:53

## 2025-02-18 RX ADMIN — ASPIRIN 81 MG: 81 TABLET, COATED ORAL at 08:53

## 2025-02-18 RX ADMIN — SODIUM CHLORIDE, PRESERVATIVE FREE 10 ML: 5 INJECTION INTRAVENOUS at 20:54

## 2025-02-18 RX ADMIN — METOPROLOL SUCCINATE 50 MG: 50 TABLET, FILM COATED, EXTENDED RELEASE ORAL at 08:53

## 2025-02-18 RX ADMIN — DIGOXIN 125 MCG: 125 TABLET ORAL at 08:53

## 2025-02-18 RX ADMIN — SACUBITRIL AND VALSARTAN 1 TABLET: 24; 26 TABLET, FILM COATED ORAL at 08:53

## 2025-02-18 RX ADMIN — BUPRENORPHINE AND NALOXONE 1 FILM: 8; 2 FILM, SOLUBLE BUCCAL; SUBLINGUAL at 08:52

## 2025-02-18 RX ADMIN — APIXABAN 5 MG: 5 TABLET, FILM COATED ORAL at 20:54

## 2025-02-18 RX ADMIN — OXYCODONE HYDROCHLORIDE AND ACETAMINOPHEN 1 TABLET: 5; 325 TABLET ORAL at 18:22

## 2025-02-18 ASSESSMENT — PAIN DESCRIPTION - LOCATION
LOCATION: HIP;LEG
LOCATION: HIP;LEG

## 2025-02-18 ASSESSMENT — PAIN SCALES - GENERAL
PAINLEVEL_OUTOF10: 6
PAINLEVEL_OUTOF10: 5
PAINLEVEL_OUTOF10: 6
PAINLEVEL_OUTOF10: 7

## 2025-02-18 NOTE — PROGRESS NOTES
Lazaro Resendiz  Neurology Follow-up  [facility] Neurology    Date of Service: 2/18/2025    Subjective:   CC: Follow up today regarding: New onset dysarthria and possible stroke    Events noted. Chart and lab reviewed.  Patient seen this morning he is resting in bed.  We discussed MRI brain results which showed no acute intracranial normality.  He reports side effects from Keppra.  He describes after taking Keppra he experiences extremity weakness and unsteadiness.  He follows with  neurology.  Today he denies any headache, dizziness, vision disturbance, dysarthria or dysphagia, extremity weakness or paresthesias.  Other review systems unremarkable      ROS : A 10-12 system review obtained and updated today and is unremarkable except as mentioned  in my interval history.     Past medical history, social history, medication and family history reviewed.       Objective:  Exam:   Constitutional:   Vitals:    02/18/25 0852 02/18/25 0922 02/18/25 0923 02/18/25 1108   BP: 96/64   (!) 96/59   Pulse: (!) 115   84   Resp: 18 18 18 18   Temp: 99.1 °F (37.3 °C)   98.4 °F (36.9 °C)   TempSrc: Temporal   Temporal   SpO2: 93%   91%   Weight:       Height:         General appearance:  Normal development and appear in no acute distress.   Mental Status:   Oriented to person, place, problem, and time.    Memory: Good immediate recall.  Intact remote memory  Normal attention span and concentration.  Language: intact naming, repeating and fluency   Good fund of Knowledge.   Cranial Nerves:   II:   Pupils: equal, round, reactive to light  III,IV,VI: Extra Ocular Movements are intact. No nystagmus  V: Facial sensation is intact  VII: Facial strength and movements: intact and symmetric  XII: Tongue movements are normal  Musculoskeletal: 5/5 in all 4 extremities.   Tone: Normal tone.   Reflexes: Symmetric 2+ in both arms and legs.  Coordination: no pronator drift, no dysmetria with FNF  Sensation: normal.  Gait/Posture: steady

## 2025-02-18 NOTE — CARE COORDINATION
Discharge Planning:    Pt reports he would go to SNF in [facility].    CM reached out to [facility] and they will check the pt and let CM know.   Pt has medicaid and precert is not needed. Provider needs to put admission to SNF for less than 30 days.    Update: [facility] is not able to accept pt. CM sent other multiple referrals via [employer].  [facility] does not have a medicaid bed.  [facility] is not able to accept pt's insurance.     Electronically signed by Fabricio Barker on 2/18/2025 at 2:44 PM  Electronically signed by Fabricio Barker on 2/18/2025 at 3:13 PM

## 2025-02-18 NOTE — PROGRESS NOTES
[facility]    2/18/2025 9:31 AM        Name: Lazaro Resendiz .              Admitted: 2/15/2025  Primary Care Provider: Daniel Amador DO (Tel: [phone])    Brief Course: Admitted with tremors, falls.         Subjective:    Patient denies dizziness or feelings of near syncope. His complaints are related to tremors, jerking-which result in him falling. Symptoms have been present for one year.     Reviewed interval ancillary notes    Current Medications  sacubitril-valsartan (ENTRESTO) 24-26 MG per tablet 0.5 tablet, BID  apixaban (ELIQUIS) tablet 5 mg, BID  clonazePAM (KLONOPIN) tablet 1 mg, Q12H  buprenorphine-naloxone (SUBOXONE) 8-2 MG SL film 1 Film, BID  gabapentin (NEURONTIN) capsule 600 mg, TID  ALPRAZolam (XANAX) tablet 0.5 mg, TID PRN  aspirin EC tablet 81 mg, Daily  digoxin (LANOXIN) tablet 125 mcg, Daily  levETIRAcetam (KEPPRA) tablet 500 mg, BID  metoprolol succinate (TOPROL XL) extended release tablet 50 mg, Daily  oxyCODONE-acetaminophen (PERCOCET) 5-325 MG per tablet 1 tablet, Q8H PRN  spironolactone (ALDACTONE) tablet 25 mg, Daily  sodium chloride flush 0.9 % injection 5-40 mL, 2 times per day  sodium chloride flush 0.9 % injection 5-40 mL, PRN  0.9 % sodium chloride infusion, PRN  ondansetron (ZOFRAN-ODT) disintegrating tablet 4 mg, Q8H PRN   Or  ondansetron (ZOFRAN) injection 4 mg, Q6H PRN  polyethylene glycol (GLYCOLAX) packet 17 g, Daily PRN        Objective:  BP 96/64   Pulse (!) 115   Temp 99.1 °F (37.3 °C) (Temporal)   Resp 18   Ht 1.854 m (6' 1\")   Wt 91.6 kg (202 lb)   SpO2 93%   BMI 26.65 kg/m²   No intake or output data in the 24 hours ending 02/18/25 0931   Wt Readings from Last 3 Encounters:   02/18/25 91.6 kg (202 lb)   12/08/24 95.7 kg (211 lb)   01/22/24 84 kg (185 lb 3 oz)       General appearance:  Appears comfortable. AAOx3  HEENT: atraumatic, Pupils equal, muscous

## 2025-02-18 NOTE — PLAN OF CARE
Problem: Chronic Conditions and Co-morbidities  Goal: Patient's chronic conditions and co-morbidity symptoms are monitored and maintained or improved  2/18/2025 1018 by Yulia Garza RN  Outcome: Adequate for Discharge  2/18/2025 1017 by Yulia Garza RN  Outcome: Progressing     Problem: Discharge Planning  Goal: Discharge to home or other facility with appropriate resources  2/18/2025 1019 by Yuila Garza RN  Outcome: Progressing  2/18/2025 1018 by Yulia Garza RN  Outcome: Adequate for Discharge  2/18/2025 1017 by Yulia Garza RN  Outcome: Progressing     Problem: Skin/Tissue Integrity  Goal: Skin integrity remains intact  Description: 1.  Monitor for areas of redness and/or skin breakdown  2.  Assess vascular access sites hourly  3.  Every 4-6 hours minimum:  Change oxygen saturation probe site  4.  Every 4-6 hours:  If on nasal continuous positive airway pressure, respiratory therapy assess nares and determine need for appliance change or resting period  2/18/2025 1019 by Yulia Garza RN  Outcome: Progressing  2/18/2025 1018 by Yulia Garza RN  Outcome: Adequate for Discharge  2/18/2025 1017 by Yulia Garza RN  Outcome: Progressing     Problem: Safety - Adult  Goal: Free from fall injury  2/18/2025 1019 by Yulia Garza RN  Outcome: Progressing  2/18/2025 1018 by Yulia Garza RN  Outcome: Adequate for Discharge  2/18/2025 1017 by Yulia Garza RN  Outcome: Progressing     Problem: ABCDS Injury Assessment  Goal: Absence of physical injury  2/18/2025 1019 by Yulia Garza RN  Outcome: Progressing  2/18/2025 1018 by Yulia Garza RN  Outcome: Adequate for Discharge  2/18/2025 1017 by Yulia Garza RN  Outcome: Progressing     Problem: Pain  Goal: Verbalizes/displays adequate comfort level or baseline comfort level  2/18/2025 1019 by Yulia Garza RN  Outcome: Progressing  2/18/2025 1018  by Yulia Garza, RN  Outcome: Adequate for Discharge  2/18/2025 1017 by Yulia Garza, RN  Outcome: Progressing

## 2025-02-19 VITALS
HEART RATE: 95 BPM | WEIGHT: 200 LBS | DIASTOLIC BLOOD PRESSURE: 60 MMHG | HEIGHT: 73 IN | RESPIRATION RATE: 19 BRPM | OXYGEN SATURATION: 94 % | SYSTOLIC BLOOD PRESSURE: 92 MMHG | BODY MASS INDEX: 26.51 KG/M2 | TEMPERATURE: 98.5 F

## 2025-02-19 LAB
ANION GAP SERPL CALCULATED.3IONS-SCNC: 8 MMOL/L (ref 3–16)
BASOPHILS # BLD: 0 K/UL (ref 0–0.2)
BASOPHILS NFR BLD: 0.7 %
BUN SERPL-MCNC: 18 MG/DL (ref 7–20)
CALCIUM SERPL-MCNC: 8.8 MG/DL (ref 8.3–10.6)
CHLORIDE SERPL-SCNC: 105 MMOL/L (ref 99–110)
CO2 SERPL-SCNC: 23 MMOL/L (ref 21–32)
CREAT SERPL-MCNC: 0.8 MG/DL (ref 0.9–1.3)
DEPRECATED RDW RBC AUTO: 14 % (ref 12.4–15.4)
EOSINOPHIL # BLD: 0.1 K/UL (ref 0–0.6)
EOSINOPHIL NFR BLD: 2.2 %
GFR SERPLBLD CREATININE-BSD FMLA CKD-EPI: >90 ML/MIN/{1.73_M2}
GLUCOSE SERPL-MCNC: 101 MG/DL (ref 70–99)
HCT VFR BLD AUTO: 43.6 % (ref 40.5–52.5)
HGB BLD-MCNC: 14.6 G/DL (ref 13.5–17.5)
LYMPHOCYTES # BLD: 2 K/UL (ref 1–5.1)
LYMPHOCYTES NFR BLD: 42.3 %
MCH RBC QN AUTO: 31 PG (ref 26–34)
MCHC RBC AUTO-ENTMCNC: 33.5 G/DL (ref 31–36)
MCV RBC AUTO: 92.6 FL (ref 80–100)
MONOCYTES # BLD: 0.6 K/UL (ref 0–1.3)
MONOCYTES NFR BLD: 13.7 %
NEUTROPHILS # BLD: 1.9 K/UL (ref 1.7–7.7)
NEUTROPHILS NFR BLD: 41.1 %
PLATELET # BLD AUTO: 113 K/UL (ref 135–450)
PMV BLD AUTO: 8.7 FL (ref 5–10.5)
POTASSIUM SERPL-SCNC: 4.6 MMOL/L (ref 3.5–5.1)
RBC # BLD AUTO: 4.7 M/UL (ref 4.2–5.9)
SODIUM SERPL-SCNC: 136 MMOL/L (ref 136–145)
WBC # BLD AUTO: 4.7 K/UL (ref 4–11)

## 2025-02-19 PROCEDURE — 85025 COMPLETE CBC W/AUTO DIFF WBC: CPT

## 2025-02-19 PROCEDURE — 2580000003 HC RX 258: Performed by: PHYSICIAN ASSISTANT

## 2025-02-19 PROCEDURE — 6370000000 HC RX 637 (ALT 250 FOR IP): Performed by: PHYSICIAN ASSISTANT

## 2025-02-19 PROCEDURE — 6370000000 HC RX 637 (ALT 250 FOR IP): Performed by: NURSE PRACTITIONER

## 2025-02-19 PROCEDURE — 2500000003 HC RX 250 WO HCPCS: Performed by: NURSE PRACTITIONER

## 2025-02-19 PROCEDURE — 80048 BASIC METABOLIC PNL TOTAL CA: CPT

## 2025-02-19 PROCEDURE — 36415 COLL VENOUS BLD VENIPUNCTURE: CPT

## 2025-02-19 PROCEDURE — 6370000000 HC RX 637 (ALT 250 FOR IP): Performed by: INTERNAL MEDICINE

## 2025-02-19 RX ORDER — ALPRAZOLAM 0.5 MG
0.5 TABLET ORAL 3 TIMES DAILY PRN
COMMUNITY
Start: 2025-02-19 | End: 2025-02-19 | Stop reason: HOSPADM

## 2025-02-19 RX ORDER — 0.9 % SODIUM CHLORIDE 0.9 %
500 INTRAVENOUS SOLUTION INTRAVENOUS ONCE
Status: COMPLETED | OUTPATIENT
Start: 2025-02-19 | End: 2025-02-19

## 2025-02-19 RX ORDER — 0.9 % SODIUM CHLORIDE 0.9 %
500 INTRAVENOUS SOLUTION INTRAVENOUS ONCE
Status: DISCONTINUED | OUTPATIENT
Start: 2025-02-19 | End: 2025-02-19 | Stop reason: HOSPADM

## 2025-02-19 RX ORDER — METOPROLOL SUCCINATE 50 MG/1
50 TABLET, EXTENDED RELEASE ORAL DAILY
Qty: 30 TABLET | Refills: 0 | Status: SHIPPED | OUTPATIENT
Start: 2025-02-19 | End: 2025-02-19

## 2025-02-19 RX ORDER — LEVETIRACETAM 500 MG/1
500 TABLET ORAL 2 TIMES DAILY
Qty: 60 TABLET | Refills: 1 | Status: SHIPPED | OUTPATIENT
Start: 2025-02-19 | End: 2025-02-19

## 2025-02-19 RX ORDER — LEVETIRACETAM 500 MG/1
500 TABLET ORAL 2 TIMES DAILY
Qty: 60 TABLET | Refills: 1 | Status: SHIPPED | OUTPATIENT
Start: 2025-02-19

## 2025-02-19 RX ORDER — METOPROLOL SUCCINATE 50 MG/1
50 TABLET, EXTENDED RELEASE ORAL DAILY
Qty: 30 TABLET | Refills: 0 | Status: SHIPPED | OUTPATIENT
Start: 2025-02-19

## 2025-02-19 RX ADMIN — METOPROLOL SUCCINATE 50 MG: 50 TABLET, FILM COATED, EXTENDED RELEASE ORAL at 08:46

## 2025-02-19 RX ADMIN — GABAPENTIN 300 MG: 300 CAPSULE ORAL at 08:47

## 2025-02-19 RX ADMIN — ALPRAZOLAM 0.5 MG: 0.5 TABLET ORAL at 04:57

## 2025-02-19 RX ADMIN — ASPIRIN 81 MG: 81 TABLET, COATED ORAL at 08:46

## 2025-02-19 RX ADMIN — SODIUM CHLORIDE, PRESERVATIVE FREE 10 ML: 5 INJECTION INTRAVENOUS at 08:47

## 2025-02-19 RX ADMIN — BUPRENORPHINE AND NALOXONE 1 FILM: 8; 2 FILM, SOLUBLE BUCCAL; SUBLINGUAL at 08:47

## 2025-02-19 RX ADMIN — ALPRAZOLAM 0.5 MG: 0.5 TABLET ORAL at 11:35

## 2025-02-19 RX ADMIN — SODIUM CHLORIDE 500 ML: 9 INJECTION, SOLUTION INTRAVENOUS at 14:32

## 2025-02-19 RX ADMIN — CLONAZEPAM 0.5 MG: 0.5 TABLET ORAL at 08:51

## 2025-02-19 RX ADMIN — OXYCODONE HYDROCHLORIDE AND ACETAMINOPHEN 1 TABLET: 5; 325 TABLET ORAL at 02:33

## 2025-02-19 RX ADMIN — OXYCODONE HYDROCHLORIDE AND ACETAMINOPHEN 1 TABLET: 5; 325 TABLET ORAL at 11:35

## 2025-02-19 RX ADMIN — APIXABAN 5 MG: 5 TABLET, FILM COATED ORAL at 08:47

## 2025-02-19 RX ADMIN — DIGOXIN 125 MCG: 125 TABLET ORAL at 08:47

## 2025-02-19 RX ADMIN — SODIUM CHLORIDE 500 ML: 9 INJECTION, SOLUTION INTRAVENOUS at 09:55

## 2025-02-19 ASSESSMENT — PAIN DESCRIPTION - ORIENTATION: ORIENTATION: LEFT

## 2025-02-19 ASSESSMENT — PAIN DESCRIPTION - LOCATION: LOCATION: HIP;LEG

## 2025-02-19 ASSESSMENT — PAIN SCALES - GENERAL
PAINLEVEL_OUTOF10: 3
PAINLEVEL_OUTOF10: 8
PAINLEVEL_OUTOF10: 6

## 2025-02-19 ASSESSMENT — PAIN - FUNCTIONAL ASSESSMENT: PAIN_FUNCTIONAL_ASSESSMENT: ACTIVITIES ARE NOT PREVENTED

## 2025-02-19 ASSESSMENT — PAIN DESCRIPTION - DESCRIPTORS: DESCRIPTORS: ACHING;DISCOMFORT;SHARP

## 2025-02-19 NOTE — PLAN OF CARE
Problem: Chronic Conditions and Co-morbidities  Goal: Patient's chronic conditions and co-morbidity symptoms are monitored and maintained or improved  2/19/2025 0755 by Yulia Garza RN  Outcome: Progressing  2/18/2025 2309 by Dk Livingston RN  Outcome: Progressing     Problem: Discharge Planning  Goal: Discharge to home or other facility with appropriate resources  2/19/2025 0755 by Yulia Garza RN  Outcome: Progressing  2/18/2025 2309 by Dk Livingston RN  Outcome: Progressing     Problem: Skin/Tissue Integrity  Goal: Skin integrity remains intact  Description: 1.  Monitor for areas of redness and/or skin breakdown  2.  Assess vascular access sites hourly  3.  Every 4-6 hours minimum:  Change oxygen saturation probe site  4.  Every 4-6 hours:  If on nasal continuous positive airway pressure, respiratory therapy assess nares and determine need for appliance change or resting period  2/19/2025 0755 by Yulia Garza RN  Outcome: Progressing  2/18/2025 2309 by Dk Livingston RN  Outcome: Progressing     Problem: Safety - Adult  Goal: Free from fall injury  2/19/2025 0755 by Yulia Garza RN  Outcome: Progressing  2/18/2025 2309 by Dk Livingston RN  Outcome: Progressing     Problem: ABCDS Injury Assessment  Goal: Absence of physical injury  2/19/2025 0755 by Yulia Garza RN  Outcome: Progressing  2/18/2025 2309 by Dk Livingston RN  Outcome: Progressing     Problem: Pain  Goal: Verbalizes/displays adequate comfort level or baseline comfort level  2/19/2025 0755 by Yulia Garza RN  Outcome: Progressing  2/18/2025 2309 by Dk Livingston RN  Outcome: Progressing

## 2025-02-19 NOTE — PROGRESS NOTES
Patient refused to have bolus complete, states he is wanting to leave now. IV removed, heart monitor removed. Patient wants an uber to the [facility] [address] to  his suboxone.

## 2025-02-19 NOTE — PROGRESS NOTES
Physical Therapy    Patient politely declines therapy at this time, requesting therapist to return later in the day. Will attempt as schedule permits. Thank you    Stephanie Castro PT, DPT [id number]

## 2025-02-19 NOTE — PLAN OF CARE
Problem: Chronic Conditions and Co-morbidities  Goal: Patient's chronic conditions and co-morbidity symptoms are monitored and maintained or improved  2/18/2025 2309 by Dk Livingston RN  Outcome: Progressing  2/18/2025 1019 by Yulia Garza RN  Outcome: Progressing  2/18/2025 1018 by Yulia Garza RN  Outcome: Adequate for Discharge  2/18/2025 1017 by Yulia Garza RN  Outcome: Progressing     Problem: Discharge Planning  Goal: Discharge to home or other facility with appropriate resources  2/18/2025 2309 by Dk Livingston RN  Outcome: Progressing  2/18/2025 1019 by Yulia Garza RN  Outcome: Progressing  2/18/2025 1018 by Yulia Garza RN  Outcome: Adequate for Discharge  2/18/2025 1017 by Yulia Garza RN  Outcome: Progressing     Problem: Skin/Tissue Integrity  Goal: Skin integrity remains intact  Description: 1.  Monitor for areas of redness and/or skin breakdown  2.  Assess vascular access sites hourly  3.  Every 4-6 hours minimum:  Change oxygen saturation probe site  4.  Every 4-6 hours:  If on nasal continuous positive airway pressure, respiratory therapy assess nares and determine need for appliance change or resting period  2/18/2025 2309 by Dk Livingston RN  Outcome: Progressing  2/18/2025 1019 by Yulia Garza, RN  Outcome: Progressing  2/18/2025 1018 by Yulia Garza, RN  Outcome: Adequate for Discharge  2/18/2025 1017 by Yulia Garza RN  Outcome: Progressing     Problem: Safety - Adult  Goal: Free from fall injury  2/18/2025 2309 by Dk Livingston RN  Outcome: Progressing  2/18/2025 1019 by Yulia Garza RN  Outcome: Progressing  2/18/2025 1018 by Yulia Garza RN  Outcome: Adequate for Discharge  2/18/2025 1017 by Yulia Garza, RN  Outcome: Progressing

## 2025-02-19 NOTE — PLAN OF CARE
Problem: Chronic Conditions and Co-morbidities  Goal: Patient's chronic conditions and co-morbidity symptoms are monitored and maintained or improved  2/19/2025 1554 by Yulia Garza RN  Outcome: Progressing  2/19/2025 0755 by Yulia Garza RN  Outcome: Progressing     Problem: Discharge Planning  Goal: Discharge to home or other facility with appropriate resources  2/19/2025 1554 by Yulia Garza RN  Outcome: Progressing  2/19/2025 0755 by Yulia Garza RN  Outcome: Progressing     Problem: Skin/Tissue Integrity  Goal: Skin integrity remains intact  Description: 1.  Monitor for areas of redness and/or skin breakdown  2.  Assess vascular access sites hourly  3.  Every 4-6 hours minimum:  Change oxygen saturation probe site  4.  Every 4-6 hours:  If on nasal continuous positive airway pressure, respiratory therapy assess nares and determine need for appliance change or resting period  2/19/2025 1554 by Yulia Garza RN  Outcome: Progressing  2/19/2025 0755 by Yulia Garza RN  Outcome: Progressing     Problem: Safety - Adult  Goal: Free from fall injury  2/19/2025 1554 by Yulia Garza RN  Outcome: Progressing  2/19/2025 0755 by Yulia Garza RN  Outcome: Progressing     Problem: ABCDS Injury Assessment  Goal: Absence of physical injury  2/19/2025 1554 by Yulia Garza RN  Outcome: Progressing  2/19/2025 0755 by Yulia Garza RN  Outcome: Progressing     Problem: Pain  Goal: Verbalizes/displays adequate comfort level or baseline comfort level  2/19/2025 1554 by Yulia Garza RN  Outcome: Progressing  2/19/2025 0755 by Yulia Garza RN  Outcome: Progressing

## 2025-02-19 NOTE — PROGRESS NOTES
CLINICAL PHARMACY NOTE: MEDS TO BEDS    Total # of Prescriptions Filled: 2   The following medications were delivered to the patient:  Levetiracetam 500mg  Metoprolol succinate ER 50mg    Additional Documentation:     Eliquis was too soon on patient's insurance until 2/24, patient made aware    Medications were picked up in the Outpatient Pharmacy by patient    Irma Thakkar CPhT

## 2025-02-19 NOTE — CARE COORDINATION
Discharge Planning:    CM called Estella with [facility] [phone], no answer, LVM with CB information.    CM called Servando with [facility] [phone], spoke with Servando and he reports they are not able to accept the pt due to behavior, pt would bring alcohol, smoke in the room and would not cooperate.    CM called Mayelin with [facility]  [phone], no answer, LVM with CB information.    CM called Yamilet with [facility], and they don't take medicaid pts.    CM received a call back from Ana María for [facility], she said she will look the pt's info over and let me know.    Update: Provider and nurse spoke with pt, pt is agreeable to [facility] and will be going to his friends house.    Update 1245: CM called Vanda with [employer] and she is able to accept pt for [facility].    Update: Pt informed CM that he will be staying with his friend [address]: [address] [place] [address]    Electronically signed by Fabricio Barker on 2/19/2025 at 9:00 AM  Electronically signed by Fabricio Barker on 2/19/2025 at 9:23 AM  Electronically signed by Fabricio Barker on 2/19/2025 at 12:46 PM  Electronically signed by Fabricio Barker on 2/19/2025 at 3:33 PM

## 2025-02-19 NOTE — DISCHARGE INSTR - COC
Continuity of Care Form    Patient Name: Lazaro Resendiz   :  1970  MRN:  [id number]    Admit date:  2/15/2025  Discharge date:  ***    Code Status Order: Full Code   Advance Directives:   Advance Care Flowsheet Documentation             Admitting Physician:  Abhishek Negron DO  PCP: Daniel Amador DO    Discharging Nurse: ***  Discharging Hospital Unit/Room#: 3TN-3369/3369-01  Discharging Unit Phone Number: ***    Emergency Contact:   Extended Emergency Contact Information  Primary Emergency Contact: Kris Resendiz  Home Phone: [phone]  Relation: Brother/Sister  Secondary Emergency Contact: Esa Charlton  [employer] Phone: [phone]  Relation: Child    Past Surgical History:  Past Surgical History:   Procedure Laterality Date    KNEE ARTHROSCOPY Bilateral     SHOULDER ARTHROPLASTY Left        Immunization History:   There is no immunization history for the selected administration types on file for this patient.    Active Problems:  Patient Active Problem List   Diagnosis Code    Heroin overdose ([facility]) T40.1X1A    Leukocytosis D72.829    Persistent atrial fibrillation ([facility]) I48.19    Chronic diastolic heart failure ([facility]) I50.32    Polysubstance abuse ([facility]) F19.10    Coronary artery disease involving native coronary artery of native heart without angina pectoris I25.10    HFrEF (heart failure with reduced ejection fraction) ([facility]) I50.20    Seizure disorder ([facility]) G40.909    Noncompliance with medications Z91.148    HTN (hypertension), benign I10    Dysarthria R47.1    Left-sided weakness R53.1    Atrial fibrillation with RVR ([facility]) I48.91    Cerebrovascular accident (CVA) ([facility]) I63.9    PAF (paroxysmal atrial fibrillation) ([facility]) I48.0       Isolation/Infection:   Isolation            Contact          Patient Infection Status       Infection Onset Added Last Indicated Last Indicated By Review Planned Expiration Resolved Resolved By    MRSA 06/10/23 01/19/24  Pierce Noriega        Added from external  infection.            Nurse Assessment:  Last Vital Signs: BP 92/60   Pulse 95   Temp 98.5 °F (36.9 °C) (Temporal)   Resp 19   Ht 1.854 m (6' 1\")   Wt 90.7 kg (200 lb)   SpO2 94%   BMI 26.39 kg/m²     Last documented pain score (0-10 scale): Pain Level: 6  Last Weight:   Wt Readings from Last 1 Encounters:   25 90.7 kg (200 lb)     Mental Status:  {IP PT MENTAL STATUS:}    IV Access:  { PRINCE IV ACCESS:585099276}    Nursing Mobility/ADLs:  Walking   {CHP DME ADLs:758941304}  Transfer  {CHP DME ADLs:373510609}  Bathing  {CHP DME ADLs:437603098}  Dressing  {CHP DME ADLs:679774999}  Toileting  {CHP DME ADLs:589902646}  Feeding  {CHP DME ADLs:502253730}  Med Admin  {P DME ADLs:163999565}  Med Delivery   { PRINCE MED Delivery:970846120}    Wound Care Documentation and Therapy:        Elimination:  Continence:   Bowel: {YES / NO:}  Bladder: {YES / NO:}  Urinary Catheter: {Urinary Catheter:878658048}   Colostomy/Ileostomy/Ileal Conduit: {YES / NO:}       Date of Last BM: ***  No intake or output data in the 24 hours ending 25 1546  No intake/output data recorded.    Safety Concerns:     { PRINCE Safety Concerns:206224761}    Impairments/Disabilities:      { PRINCE Impairments/Disabilities:913024121}    Nutrition Therapy:  Current Nutrition Therapy:   { PRINCE Diet List:597048768}    Routes of Feeding: {CHP DME Other Feedings:763933956}  Liquids: {Slp liquid thickness:81588}  Daily Fluid Restriction: {CHP DME Yes amt example:695240951}  Last Modified Barium Swallow with Video (Video Swallowing Test): {Done Not Done Date:}    Treatments at the Time of Hospital Discharge:   Respiratory Treatments: ***  Oxygen Therapy:  {Therapy; copd oxygen:73103}  Ventilator:    { CC Vent List:816179360}    Rehab Therapies: {THERAPEUTIC INTERVENTION:3550279779}  Weight Bearing Status/Restrictions: { CC Weight Bearin}  Other Medical Equipment (for information only, NOT a DME order):

## 2025-02-19 NOTE — PROGRESS NOTES
Data- discharge order received, pt verbalized agreement to discharge, disposition to previous residence, [facility] ordered for PT/OT.     Action- discharge instructions prepared and given to patient, pt verbalized understanding. Medication information packet given r/t NEW and/or CHANGED prescriptions emphasizing name/purpose/side effects, pt verbalized understanding. Discharge instruction summary: Diet- regular, Activity- as tolerated, Primary Care Physician as follows: Daniel Amador -[phone] f/u appointment to be scheduled by patient, immunizations reviewed and up to date, prescription medications filled and picked up by patient. Inpatient surgical procedure precautions reviewed.    1. WEIGHT: Admit Weight - Scale: 94.8 kg (209 lb) (02/15/25 2114)        Today  Weight - Scale: 90.7 kg (200 lb) (02/19/25 0718)       2. O2 SAT.: SpO2: 94 % (02/19/25 1145)    Response- Pt belongings gathered, IV removed. Disposition is home (no HHC/DME needs), transported with belongings, taken to lobby via w/c w/ belongings, no complications.

## 2025-02-20 NOTE — DISCHARGE SUMMARY
Hospital Medicine Discharge Summary    Patient: Lazaro Resendiz     Gender: male  : 1970   Age: 54 y.o.  MRN: [id number]    Admitting Physician: Abhishek Negron DO  Discharge Physician: Ruthy Quiros PA-C     Code Status: Full code    Admit Date: 2/15/2025   Discharge Date: 2025      Disposition:  Home  Time spent arranging discharge: 35 minutes    Discharge Diagnoses:    Active Hospital Problems    Diagnosis Date Noted    Cerebrovascular accident (CVA) ([facility]) [I63.9] 2025    PAF (paroxysmal atrial fibrillation) ([facility]) [I48.0] 2025    Dysarthria [R47.1] 2025    Left-sided weakness [R53.1] 2025    Atrial fibrillation with RVR ([facility]) [I48.91] 2025    HTN (hypertension), benign [I10] 2024    Noncompliance with medications [Z91.148] 2024    Seizure disorder ([facility]) [G40.909] 2024    Coronary artery disease involving native coronary artery of native heart without angina pectoris [I25.10] 2023       Recommendations: Follow up with PCP in one week. Cardiology in 2 weeks, neurology in 1-2 weeks, GI as scheduled.    Condition at Discharge:  Stable    Hospital Course:   Patient is a 54-year-old male with a history of atrial fibrillation, cardiac arrest in  secondary to drug overdose, seizure, who presented with L sided weakness and dysarthria. Patient also states he has had tremors and jerking movements for over a year which results in him falling. He thinks it is from the Keppra. He has a history of CHF but is not compliant with medications. In the ED, initial CT and CTA of the head were unremarkable.  Patient was admitted and seen by neurology.  He underwent an MRI of the brain which was negative for stroke.  He does not have any focal weakness.  The etiology of his twitching and tremors is unclear.  Given the fact that he had a seizure following extubation after his cardiac arrest neurology has recommended continuing the Keppra for now at current dose and  following up with his primary neurologist who is Dr Gregg. Patient did have an echocardiogram during his stay which revealed normal EF and normal diastolic dysfunction (EF improved from 25% a year ago to 55-60% ). He does have chronic hypotension with his BP running in the 90s systolic. This has been present on multiple office visits. He is not orthostatic. Patient is non compliant with cardiac meds with the exception of eliquis. He will continue metoprolol however aldactone and and entresto were stopped given hypotension. Of note patient was prescribed klonopin and xanax by his PCP however tox screen was was completely normal.Patient was seen by PT/OT who recommended SNF however patient wanted to go home. SNF placement also difficult due to patients history of drug use.    Discharge Exam:    BP 92/60   Pulse 95   Temp 98.5 °F (36.9 °C) (Temporal)   Resp 19   Ht 1.854 m (6' 1\")   Wt 90.7 kg (200 lb)   SpO2 94%   BMI 26.39 kg/m²   General appearance:  Appears comfortable. AAOx3  HEENT: atraumatic, Pupils equal, muscous membranes moist, no masses appreciated  Cardiovascular: Regular rate and rhythm no murmurs appreciated  Respiratory: CTAB no wheezing  Gastrointestinal: Abdomen soft, non-tender, BS+  EXT: no edema  Neurology: no gross focal deficts  Psychiatry: Appropriate affect. Not agitated  Skin: Warm, dry, no rashes appreciated    Discharge Medications:   Discharge Medication List as of 2/19/2025  3:56 PM        Discharge Medication List as of 2/19/2025  3:56 PM        CONTINUE these medications which have CHANGED    Details   apixaban (ELIQUIS) 5 MG TABS tablet Take 1 tablet by mouth 2 times daily, Disp-60 tablet, R-1Normal      levETIRAcetam (KEPPRA) 500 MG tablet Take 1 tablet by mouth 2 times daily, Disp-60 tablet, R-1Normal      metoprolol succinate (TOPROL XL) 50 MG extended release tablet Take 1 tablet by mouth daily, Disp-30 tablet, R-0Normal           Discharge Medication List as of 2/19/2025

## 2025-03-05 NOTE — PROGRESS NOTES
Physician Progress Note      PATIENT:               EZRA VIEIRA  CSN #:                  [id number]  :                       1970  ADMIT DATE:       2/15/2025 8:59 PM  DISCH DATE:        2025 4:08 PM  RESPONDING  PROVIDER #:        TAD ROMANO PA-C          QUERY TEXT:    Pt admitted with Tremors and left sided weakness. Pt noted \"could be TIA \"by   neurology , \"Patients symptoms have been present for one year. He is   convinced symptoms are from keppra\" on PN , and polypharmacy, and   decreased function mobility by PT . If possible, please document in progress   notes and discharge summary the relationship, if any, between:    The medical record reflects the following:  Risk Factors: polypharmacy, seizures,  Clinical Indicators:  PN -  \"Tremors and weakness-no focal weakness on exam. Patients symptoms have been   present for one year. He is convinced symptoms are from keppra. Neurology note   from  has suggested if MRI and EEG were normal could consider  stopped   keppra.  Afib-continue metoprolol, eliquis  Chronic sCHF-Bp has been soft. EF 55%. Will decrease entresto to half a tab.   He is also on digoxin and aldactone. Needs to reestablish with cardiology.  Polypharmacy-may be contributing to symptoms. Decrease gabapentin and   klonopin.\"  PT -  \"Impairments Requiring Therapeutic Intervention: decreased functional   mobility, decreased strength, decreased safety awareness, decreased endurance,   decreased balance\"  Neurology -  \"Acute dysarthria and ataxia, severe, not controlled.  MRI brain showed no   acute stroke.  Could be new TIA\"  DC summary -  \".Patient was seen by PT/OT who recommended SNF however patient wanted to go   home. SNF placement also difficult due to patients history of drug use.\"    Treatment: Stopped xanax/suboxone/entresto/klonopin/gabapentin/percocet on   discharge, PT/OT, CT, CTA. MRI, tox screen, care coordnation with SNF   recommendation,